# Patient Record
Sex: MALE | Race: WHITE
[De-identification: names, ages, dates, MRNs, and addresses within clinical notes are randomized per-mention and may not be internally consistent; named-entity substitution may affect disease eponyms.]

---

## 2017-06-17 ENCOUNTER — HOSPITAL ENCOUNTER (INPATIENT)
Dept: HOSPITAL 62 - ER | Age: 72
LOS: 2 days | Discharge: HOME HEALTH | DRG: 872 | End: 2017-06-19
Attending: INTERNAL MEDICINE | Admitting: INTERNAL MEDICINE
Payer: MEDICARE

## 2017-06-17 DIAGNOSIS — Z96.642: ICD-10-CM

## 2017-06-17 DIAGNOSIS — I25.10: ICD-10-CM

## 2017-06-17 DIAGNOSIS — F17.200: ICD-10-CM

## 2017-06-17 DIAGNOSIS — I50.9: ICD-10-CM

## 2017-06-17 DIAGNOSIS — N17.9: ICD-10-CM

## 2017-06-17 DIAGNOSIS — I10: ICD-10-CM

## 2017-06-17 DIAGNOSIS — I71.4: ICD-10-CM

## 2017-06-17 DIAGNOSIS — Z79.899: ICD-10-CM

## 2017-06-17 DIAGNOSIS — Z79.82: ICD-10-CM

## 2017-06-17 DIAGNOSIS — Z79.02: ICD-10-CM

## 2017-06-17 DIAGNOSIS — J01.90: ICD-10-CM

## 2017-06-17 DIAGNOSIS — E11.9: ICD-10-CM

## 2017-06-17 DIAGNOSIS — I25.2: ICD-10-CM

## 2017-06-17 DIAGNOSIS — J44.1: ICD-10-CM

## 2017-06-17 DIAGNOSIS — A41.9: Primary | ICD-10-CM

## 2017-06-17 DIAGNOSIS — I73.9: ICD-10-CM

## 2017-06-17 DIAGNOSIS — F03.90: ICD-10-CM

## 2017-06-17 LAB
%HYPO/RBC NFR BLD AUTO: SLIGHT %
ALBUMIN SERPL-MCNC: 3.9 G/DL (ref 3.5–5)
ALP SERPL-CCNC: 97 U/L (ref 38–126)
ALT SERPL-CCNC: 24 U/L (ref 21–72)
ANION GAP SERPL CALC-SCNC: 10 MMOL/L (ref 5–19)
APPEARANCE UR: CLEAR
AST SERPL-CCNC: 35 U/L (ref 17–59)
BASE EXCESS BLDV CALC-SCNC: 3.6 MMOL/L
BASOPHILS NFR BLD MANUAL: 0 % (ref 0–2)
BILIRUB DIRECT SERPL-MCNC: 0.4 MG/DL (ref 0–0.4)
BILIRUB SERPL-MCNC: 0.7 MG/DL (ref 0.2–1.3)
BILIRUB UR QL STRIP: NEGATIVE
BUN SERPL-MCNC: 20 MG/DL (ref 7–20)
CALCIUM: 9.3 MG/DL (ref 8.4–10.2)
CHLORIDE SERPL-SCNC: 95 MMOL/L (ref 98–107)
CO2 SERPL-SCNC: 30 MMOL/L (ref 22–30)
CREAT SERPL-MCNC: 1.59 MG/DL (ref 0.52–1.25)
EOSINOPHIL NFR BLD MANUAL: 1 % (ref 0–6)
ERYTHROCYTE [DISTWIDTH] IN BLOOD BY AUTOMATED COUNT: 14.7 % (ref 11.5–14)
GLUCOSE SERPL-MCNC: 116 MG/DL (ref 75–110)
GLUCOSE UR STRIP-MCNC: NEGATIVE MG/DL
HCO3 BLDV-SCNC: 31.1 MMOL/L (ref 20–32)
HCT VFR BLD CALC: 47 % (ref 37.9–51)
HGB BLD-MCNC: 15.3 G/DL (ref 13.5–17)
HGB HCT DIFFERENCE: -1.1
KETONES UR STRIP-MCNC: NEGATIVE MG/DL
MCH RBC QN AUTO: 29 PG (ref 27–33.4)
MCHC RBC AUTO-ENTMCNC: 32.6 G/DL (ref 32–36)
MCV RBC AUTO: 89 FL (ref 80–97)
NEUTS BAND NFR BLD MANUAL: 6 % (ref 3–5)
NITRITE UR QL STRIP: NEGATIVE
PCO2 BLDV: 59.4 MMHG (ref 35–63)
PH BLDV: 7.34 [PH] (ref 7.3–7.42)
PH UR STRIP: 7 [PH] (ref 5–9)
PLATELET CLUMP BLD QL SMEAR: PRESENT
POTASSIUM SERPL-SCNC: 4.8 MMOL/L (ref 3.6–5)
PROT SERPL-MCNC: 7.2 G/DL (ref 6.3–8.2)
PROT UR STRIP-MCNC: NEGATIVE MG/DL
PROTHROMBIN TIME: 12.7 SEC (ref 11.4–15.4)
RBC # BLD AUTO: 5.29 10^6/UL (ref 4.35–5.55)
SODIUM SERPL-SCNC: 135.3 MMOL/L (ref 137–145)
SP GR UR STRIP: 1.01
TOTAL CELLS COUNTED BLD: 100
UROBILINOGEN UR-MCNC: NEGATIVE MG/DL (ref ?–2)
VARIANT LYMPHS NFR BLD MANUAL: 3 % (ref 13–45)
WBC # BLD AUTO: 15.7 10^3/UL (ref 4–10.5)

## 2017-06-17 PROCEDURE — 83880 ASSAY OF NATRIURETIC PEPTIDE: CPT

## 2017-06-17 PROCEDURE — 71010: CPT

## 2017-06-17 PROCEDURE — 93005 ELECTROCARDIOGRAM TRACING: CPT

## 2017-06-17 PROCEDURE — 96365 THER/PROPH/DIAG IV INF INIT: CPT

## 2017-06-17 PROCEDURE — 85025 COMPLETE CBC W/AUTO DIFF WBC: CPT

## 2017-06-17 PROCEDURE — 82803 BLOOD GASES ANY COMBINATION: CPT

## 2017-06-17 PROCEDURE — 85610 PROTHROMBIN TIME: CPT

## 2017-06-17 PROCEDURE — 36415 COLL VENOUS BLD VENIPUNCTURE: CPT

## 2017-06-17 PROCEDURE — 87086 URINE CULTURE/COLONY COUNT: CPT

## 2017-06-17 PROCEDURE — 71275 CT ANGIOGRAPHY CHEST: CPT

## 2017-06-17 PROCEDURE — 83605 ASSAY OF LACTIC ACID: CPT

## 2017-06-17 PROCEDURE — 93010 ELECTROCARDIOGRAM REPORT: CPT

## 2017-06-17 PROCEDURE — 80053 COMPREHEN METABOLIC PANEL: CPT

## 2017-06-17 PROCEDURE — 81001 URINALYSIS AUTO W/SCOPE: CPT

## 2017-06-17 PROCEDURE — 80048 BASIC METABOLIC PNL TOTAL CA: CPT

## 2017-06-17 PROCEDURE — 87040 BLOOD CULTURE FOR BACTERIA: CPT

## 2017-06-17 PROCEDURE — 94640 AIRWAY INHALATION TREATMENT: CPT

## 2017-06-17 PROCEDURE — 84484 ASSAY OF TROPONIN QUANT: CPT

## 2017-06-17 PROCEDURE — 82962 GLUCOSE BLOOD TEST: CPT

## 2017-06-17 PROCEDURE — 70450 CT HEAD/BRAIN W/O DYE: CPT

## 2017-06-17 PROCEDURE — 74177 CT ABD & PELVIS W/CONTRAST: CPT

## 2017-06-17 PROCEDURE — 99285 EMERGENCY DEPT VISIT HI MDM: CPT

## 2017-06-17 RX ADMIN — SODIUM CHLORIDE PRN ML: 9 INJECTION, SOLUTION INTRAVENOUS at 21:25

## 2017-06-17 RX ADMIN — CLINDAMYCIN PHOSPHATE SCH ML: 12 INJECTION, SOLUTION INTRAVENOUS at 21:24

## 2017-06-17 RX ADMIN — GABAPENTIN SCH MG: 300 CAPSULE ORAL at 21:25

## 2017-06-17 NOTE — RADIOLOGY REPORT (SQ)
EXAM DESCRIPTION:  CTA CHEST



COMPLETED DATE/TIME:  6/17/2017 4:36 pm



REASON FOR STUDY:  SOB, tachycardia, hypoxia



COMPARISON:  None.



TECHNIQUE:  CT scan of the chest performed using helical scanning technique with dynamic intravenous 
contrast injection.  Images reviewed with lung, soft tissue and bone windows.  Reconstructed coronal 
and sagittal MPR images reviewed.

Additional 3 dimensional post-processing performed to develop Maximal Intensity Projection images (MI
P).  All images stored on PACS.

All CT scanners at this facility use dose modulation, iterative reconstruction, and/or weight based d
osing when appropriate to reduce radiation dose to as low as reasonably achievable (ALARA).

[facility]: Dose Right  [facility]: CareDose    [facility]: Dose Right    [facility]: Teradose 4D    [facility]: Smart Technologies



CONTRAST TYPE AND DOSE:  82 mL of Isovue 300- low osmolar.



RENAL FUNCTION:  BUN 20 creatinine 1.59



RADIATION DOSE:  41.33 .



LIMITATIONS:  None.



FINDINGS:  LUNGS AND PLEURA: There is parenchymal scarring and cystic changes noted in both lower lob
es as well as scattered in the right upper lobe.  Represent underlying pulmonary fibrosis.  Scattered
 cystic changes are noted throughout the lungs could represent  emphysematous disease.  There is a 1.
6 cm nodular opacity noted in the right upper lobe (series 4, image 49).  No focal infiltrates.  No p
neumothorax.  No significant pleural effusion.

AORTA AND GREAT VESSELS: No aneurysm or dissection.  There is diffuse fibrofatty and calcific atherom
atous disease seen throughout the descending thoracic aorta.

HEART: No pericardial effusion.  Coronary artery calcifications are noted.  There is calcifications o
f the aortic valve annulus.

PULMONARY ARTERIES: No emboli visualized in the main pulmonary arteries or the segmental branches.

HILAR AND MEDIASTINAL STRUCTURES: Soft tissue thickening seen in the right hilum could be related to 
adenopathy.  There is minimal thickening seen in the left hilum.  No significant mediastinal adenopat
hy identified.

HARDWARE: None in the chest.

UPPER ABDOMEN: See separate report of the CT of the abdomen.

THYROID AND OTHER SOFT TISSUES: No masses.  No adenopathy.

BONES: No acute or significant finding.  Multilevel degenerative changes noted in the spine.

3D MIPS: Confirm above findings.

OTHER: No other significant finding.



IMPRESSION:  1.  No acute pulmonary embolus identified.

2.  There is some parenchymal scarring and cystic changes noted the lower lobe this could be represen
t an of the underlying pulmonary fibrosis versus new parenchymal scarring related to chronic obstruct
celine pulmonary disease.  There is some parenchymal scarring noted throughout the right upper lobe.  So
me cystic changes seen in both upper lobes suggestive of emphysema.  There is a 1.6 cm nodular like o
pacity seen in the right upper lobe, follow-up recommendations of the below.  No focal consolidations
.



COMMENT:   FLEISCHNER CRITERIA FOR FOLLOW-UP OF PULMONARY NODULES

Incidentally detected new nodules in persons 35 or older.

HIGH RISK: History of smoking or other known risk factors.

>8mm single solid nodule: LOW and HIGH RISK: consider CT, PET/CT or biopsy at 3 mo.



TECHNICAL DOCUMENTATION:  JOB ID:  [id number]

Quality ID # 436: Final reports with documentation of one or more dose reduction techniques (e.g., Au
tomated exposure control, adjustment of the mA and/or kV according to patient size, use of iterative 
reconstruction technique)

 2011 [employer]- All Rights Reserved

## 2017-06-17 NOTE — RADIOLOGY REPORT (SQ)
EXAM DESCRIPTION:  CT HEAD WITHOUT



COMPLETED DATE/TIME:  6/17/2017 3:11 pm



REASON FOR STUDY:  fall, AMS



COMPARISON:  Carotid Doppler 10/29/2015



TECHNIQUE:  Axial images acquired through the brain without intravenous contrast.  Images reviewed wi
th bone, brain and subdural windows.  Images stored on PACS.

All CT scanners at this facility use dose modulation, iterative reconstruction, and/or weight based d
osing when appropriate to reduce radiation dose to as low as reasonably achievable (ALARA).

[facility]: Dose Right  [facility]: CareDose    [facility]: Dose Right    [facility]: Teradose 4D    [facility]: [employer]



RADIATION DOSE:  64.61 mGy.



LIMITATIONS:  None.



FINDINGS:  VENTRICLES: Normal size and contour.

CEREBRUM: No CT evidence of acute large territory ischemic change, acute intracranial hemorrhage, mas
s effect, or midline shift.

There is moderate bifrontal and biparietal small vessel ischemic change with a chronic lacunar infarc
t in the left caudate.

CEREBELLUM: No masses.  No hemorrhage.  No alteration of density.  No evidence for acute infarction.

EXTRAAXIAL SPACES: No fluid collections.  No masses.

ORBITS AND GLOBE: No intra- or extraconal masses.  Normal contour of globe without masses.

CALVARIUM: No fracture.

PARANASAL SINUSES: No fluid or mucosal thickening.

SOFT TISSUES: No mass or hematoma.

OTHER: No other significant finding.



IMPRESSION:  No acute findings.  Small vessel white matter disease with old lacunar infarct in the le
ft caudate.



TECHNICAL DOCUMENTATION:  JOB ID:  [id number]

Quality ID # 436: Final reports with documentation of one or more dose reduction techniques (e.g., Au
tomated exposure control, adjustment of the mA and/or kV according to patient size, use of iterative 
reconstruction technique)

 2011 [employer]- All Rights Reserved

## 2017-06-17 NOTE — RADIOLOGY REPORT (SQ)
EXAM DESCRIPTION:  CT ABD/PELVIS WITH IV ONLY



COMPLETED DATE/TIME:  6/17/2017 4:37 pm



REASON FOR STUDY:  sepsis, source unknown, abd pain



COMPARISON:  None.



TECHNIQUE:  CT scan of the abdomen and pelvis performed using helical scanning technique with dynamic
 intravenous contrast injection.  No oral contrast. Images reviewed with lung, soft tissue, and bone 
windows. Reconstructed coronal and sagittal MPR images reviewed. Delayed images for evaluation of the
 urinary system also acquired. All images stored on PACS.

All CT scanners at this facility use dose modulation, iterative reconstruction, and/or weight based d
osing when appropriate to reduce radiation dose to as low as reasonably achievable (ALARA).

[facility]: Dose Right  [facility]: CareDose    [facility]: Dose Right    [facility]: Teradose 4D    [facility]: [employer]



CONTRAST TYPE AND DOSE:  contrast/concentration: Isovue 370.00 mg/ml; Total Contrast Delivered: 82.0 
ml; Total Saline Delivered: 78.1 ml



RENAL FUNCTION:  BUN 20 creatinine 1.59



RADIATION DOSE:  32.35.



LIMITATIONS:  None.



FINDINGS:  LOWER CHEST: See separate report of the CT of the chest.

LIVER: Normal size. No masses or dilated ducts.

SPLEEN: Normal size. No focal lesions.

PANCREAS: No masses. No significant calcifications. No adjacent inflammation or peripancreatic fluid 
collections. Pancreatic duct not dilated.  Diffuse fatty replacement of the pancreas.

GALLBLADDER: No identified stones by CT criteria. No inflammatory changes to suggest cholecystitis.

ADRENAL GLANDS: 1.7 cm nodule noted in the left adrenal gland.  Most likely represents an adenoma but
 cannot be properly evaluated on this study.  Right adrenal is normal.

RIGHT KIDNEY AND URETER: No solid masses.  Numerous exophytic cysts noted throughout.  No significant
 calcifications.   No hydronephrosis or hydroureter.

LEFT KIDNEY AND URETER: No solid masses.   No significant calcifications.   No hydronephrosis or hydr
oureter.

AORTA AND VESSELS: There is aneurysmal dilatation of the infrarenal abdominal aorta measuring 3.4 x 3
.3 cm in greatest AP and transverse dimensions.  There is mural thrombus and/or fibrofatty plaque wit
hin the aneurysm sac.  Scattered calcific atherosclerosis noted in the vessel walls.  No dissection. 
 The celiac and SMA and renal arteries are patent.  DANIELA appears occluded at its origin and fills via 
collateral filling.

RETROPERITONEUM: No retroperitoneal adenopathy, hemorrhage or masses.

BOWEL AND PERITONEAL CAVITY: No masses or inflammatory changes. No free fluid or peritoneal masses.  
No pneumoperitoneum.

APPENDIX: Not visualized.  No blind-ending dilated loops of bowel are noted in the right lower quadra
nt.  No inflammatory changes seen in the right lower quadrant.

PELVIS: No masses, adenopathy, or free fluid.  There is slight thickening of the urinary bladder wall
 which may be secondary to underdistention versus muscle hypertrophy.

ABDOMINAL WALL: No masses. No hernias.

BONES: No suspicious or acute osseous abnormality identified.  Multilevel degenerative changes of the
 spine.  Left total hip arthroplasty.

OTHER: No other significant finding.



IMPRESSION:  1.  No acute abnormality identified within the abdomen pelvis

2.  Aneurysmal dilatation of the infrarenal abdominal aorta measuring 3.4 x 3.3 cm in greatest AP and
 transverse dimension.  Mural thrombus noted within the aneurysm sac.  No dissection.  SMA and celiac
 and renal arteries are patent.  The DANIELA origin appears occluded and fills via collateral filling.

3.  1.7 cm nodule in the left adrenal gland.  Not properly characterized on this study.



TECHNICAL DOCUMENTATION:  JOB ID:  [id number]

Quality ID # 436: Final reports with documentation of one or more dose reduction techniques (e.g., Au
tomated exposure control, adjustment of the mA and/or kV according to patient size, use of iterative 
reconstruction technique)

 2011 [email or web address]- All Rights Reserved

## 2017-06-17 NOTE — RADIOLOGY REPORT (SQ)
EXAM DESCRIPTION:  CHEST SINGLE VIEW



COMPLETED DATE/TIME:  6/17/2017 2:43 pm



REASON FOR STUDY:  SOB, fever



COMPARISON:  AP chest 2/17/2009



EXAM PARAMETERS:  NUMBER OF VIEWS: One view.

TECHNIQUE: Single frontal radiographic view of the chest acquired.

RADIATION DOSE: NA

LIMITATIONS: None.



FINDINGS:  LUNGS AND PLEURA: Mild increased interstitial markings at both bases may represent Kerley 
lines from mild interstitial edema.

No fluffy alveolar infiltrates worrisome for alveolar edema or pneumonia.  No gross pleural effusion.
  No pneumothorax.

MEDIASTINUM AND HILAR STRUCTURES: No masses.  Contour normal.

HEART AND VASCULAR STRUCTURES: Heart normal in size.  Normal vasculature.

BONES: No acute findings.

HARDWARE: None in the chest.

OTHER: No other significant finding.



IMPRESSION:  Few Kerley lines at the lung bases could indicate mild interstitial edema.  No pleural e
ffusions or alveolar edema.  No cardiomegaly



TECHNICAL DOCUMENTATION:  JOB ID:  [id number]

## 2017-06-17 NOTE — PDOC H&P
History of Present Illness


Admission Date/PCP: 


  





  CRYSTAL GOOD MD





Patient complains of: Fall


History of Present Illness: 


MIGEL LE is a 71 year old male, w/ history of dementia, COPD, CHF has 

runny nose and sinus congestion for 3 weeks. There is mild headache.  He is not 

on the home oxygen.  He has a mild cough productive of yellowish phlegm.  There 

is no sore throat associated.  No hemoptysis.  There is no pleurisy, shortness 

of breath, dizziness or lightheadedness, but admits having some facial pressure 

intermittently.  Denies any chills or fever as well.  Earlier the day prior to 

admission patient fell on the floor but without any significant injuries.  He 

was having generalized weakness and unable to get up.  He was found by his wife 

and was brought to the hospital.  In the emergency room where workup was 

performed he has lactic acid level was mildly elevated as well as brain 

natruretic peptide.  Chest x-ray shows chronic interstitial findings.  He does 

have a history of CHF.  Emergency physician thought it was a combination of 

sepsis as well as heart failure and so antibiotic was given but no aggressive 

IV fluid management was done.  Patient did have a fever in the emergency room.  

He denies recent travel nor any insect bites.





Past Medical History


Cardiac Medical History: Reports: Congestive Heart Failure, Coronary Artery 

Disease, Myocardial Infarction - x2, last one approx 4 yrs ago, Hypertension - 

medicated, Peripheral Vascular Disease, Other - Abdominal aorta aneurysm


Pulmonary Medical History: Reports: Chronic Obstructive Pulmonary Disease (COPD)


   Denies: Asthma


Neurological Medical History: Reports: Other - Dementia


   Denies: Seizures


Endocrine Medical History: Reports: Diabetes Mellitus Type 2


GI Medical History: 


   Denies: Hepatitis, Hiatal Hernia


Musculoskeltal Medical History: Reports: Other - left hip replacement


Hematology: 


   Denies: Anemia, Sickle Cell Disease





Past Surgical History


Past Surgical History: Reports: Orthopedic Surgery - left hip replacement, 

Other - Cataract surgery, femoral popliteal bypass


   Denies: Pacemaker





Social History


Information Source: Patient


Smoking Status: Current Every Day Smoker


Frequency of Alcohol Use: None


Hx Recreational Drug Use: No


Drugs: None





Family History


Family History: None


Parental Family History Reviewed: Yes


Children Family History Reviewed: Yes


Sibling(s) Family History Reviewed.: Yes





Medication/Allergy


Home Medications: 








Aspirin 81 mg PO DAILY 12/29/15 


Atorvastatin Calcium [Lipitor 20 mg Tablet] 20 mg PO QHS 12/29/15 


Clopidogrel Bisulfate [Plavix] 75 mg PO DAILY 12/29/15 


Diazepam [Valium] 20 mg PO QHS 12/29/15 


Gabapentin [Neurontin] 600 mg PO QHS 12/29/15 


Lisinopril 40 mg PO DAILY 12/29/15 


Metformin HCl [Glucophage] 1,000 mg PO QPM 12/29/15 


Metoprolol Succinate 200 mg PO DAILY 12/29/15 


Nitroglycerin [Nitrostat 0.4 mg (1/150 Gr) Tabs 25/Bottle] 1 tab SL Q5MP PRN 12/

29/15 


Omeprazole 20 mg PO DAILY 12/29/15 


Oxycodone HCl 30 mg PO QAM 12/29/15 


Donepezil HCl 5 mg PO DAILY 06/17/17 


Meloxicam 7.5 mg PO DAILY 06/17/17 


Oxycodone HCl 15 mg PO Q4HP PRN 06/17/17 








Allergies/Adverse Reactions: 


 





Penicillins Allergy (Verified 12/29/15 12:58)


 unknown reaction from a child











Review of Systems


Constitutional: PRESENT: fatigue, headache(s), weakness - Generalized.  ABSENT: 

chills, fever(s), weight gain, weight loss


Eyes: ABSENT: visual disturbances


Ears: ABSENT: hearing changes


Nose, Mouth, and Throat: ABSENT: mouth pain, sore throat


Cardiovascular: ABSENT: chest pain, dyspnea on exertion, edema, orthropnea, 

palpitations


Respiratory: PRESENT: cough, sputum.  ABSENT: dyspnea, hemoptysis


Gastrointestinal: ABSENT: abdominal pain, constipation, diarrhea, hematemesis, 

hematochezia, melena, nausea, vomiting


Genitourinary: ABSENT: dysuria, hematuria


Musculoskeletal: ABSENT: joint swelling


Integumentary: ABSENT: pruritus, rash, wounds


Neurological: ABSENT: abnormal gait, abnormal speech, confusion, dizziness, 

focal weakness, syncope


Psychiatric: ABSENT: anxiety, depression, homidical ideation, suicidal ideation


Endocrine: ABSENT: cold intolerance, heat intolerance, polydipsia, polyuria


Hematologic/Lymphatic: ABSENT: easy bleeding, easy bruising





Physical Exam


Vital Signs: 


 











Temp Pulse Resp BP Pulse Ox


 


 100.1 F   115 H  20   117/84   93 


 


 06/17/17 12:57  06/17/17 12:57  06/17/17 16:00  06/17/17 12:57  06/17/17 16:00











General appearance: PRESENT: no acute distress, cooperative, obese


Head exam: PRESENT: atraumatic, normocephalic


Eye exam: PRESENT: conjunctiva pink, EOMI, PERRLA.  ABSENT: scleral icterus


Ear exam: PRESENT: normal external ear exam.  ABSENT: drainage


Mouth exam: PRESENT: moist, neck supple, tongue midline


Throat exam: PRESENT: other - Postnasal drip.  ABSENT: post pharyngeal erythema

, tonsillar erythema


Neck exam: ABSENT: carotid bruit, JVD, lymphadenopathy, thyromegaly


Respiratory exam: PRESENT: clear to auscultation elle - Anteriorly, decreased 

breath sounds - Bilateral.  ABSENT: rales, rhonchi, wheezes


Cardiovascular exam: PRESENT: RRR.  ABSENT: diastolic murmur, rubs, systolic 

murmur


Pulses: PRESENT: normal dorsalis pedis pul


Vascular exam: PRESENT: normal capillary refill


GI/Abdominal exam: PRESENT: normal bowel sounds, soft.  ABSENT: distended, 

guarding, mass, organolmegaly, rebound, tenderness


Rectal exam: PRESENT: deferred


Extremities exam: PRESENT: full ROM.  ABSENT: calf tenderness, clubbing, pedal 

edema


Neurological exam: PRESENT: alert, awake, oriented to person, oriented to place

, oriented to time, oriented to situation, CN II-XII grossly intact


Psychiatric exam: PRESENT: appropriate affect, normal mood.  ABSENT: homicidal 

ideation, suicidal ideation


Skin exam: PRESENT: dry, intact, warm.  ABSENT: cyanosis, rash





Results


Laboratory Results: 


 





 06/17/17 14:03 





 06/17/17 14:03 





 











  06/17/17 06/17/17 06/17/17





  13:34 14:03 14:03


 


WBC   15.7 H 


 


RBC   5.29 


 


Hgb   15.3 


 


Hct   47.0 


 


MCV   89 


 


MCH   29.0 


 


MCHC   32.6 


 


RDW   14.7 H 


 


Plt Count   154 


 


Seg Neutrophils %   Not Reportable 


 


Lymphocytes %   Not Reportable 


 


Monocytes %   Not Reportable 


 


Eosinophils %   Not Reportable 


 


Basophils %   Not Reportable 


 


Absolute Neutrophils   Not Reportable 


 


Absolute Lymphocytes   Not Reportable 


 


Absolute Monocytes   Not Reportable 


 


Absolute Eosinophils   Not Reportable 


 


Absolute Basophils   Not Reportable 


 


VBG pH   


 


VBG pCO2   


 


VBG HCO3   


 


VBG Base Excess   


 


Sodium    135.3 L


 


Potassium    4.8


 


Chloride    95 L


 


Carbon Dioxide    30


 


Anion Gap    10


 


BUN    20


 


Creatinine    1.59 H


 


Est GFR ( Amer)    52 L


 


Est GFR (Non-Af Amer)    43 L


 


Glucose    116 H


 


Lactic Acid   


 


Calcium    9.3


 


Total Bilirubin    0.7


 


AST    35


 


ALT    24


 


Alkaline Phosphatase    97


 


Total Protein    7.2


 


Albumin    3.9


 


Urine Color  YELLOW  


 


Urine Appearance  CLEAR  


 


Urine pH  7.0  


 


Ur Specific Gravity  1.008  


 


Urine Protein  NEGATIVE  


 


Urine Glucose (UA)  NEGATIVE  


 


Urine Ketones  NEGATIVE  


 


Urine Blood  NEGATIVE  


 


Urine Nitrite  NEGATIVE  


 


Ur Leukocyte Esterase  NEGATIVE  


 


Urine WBC (Auto)  0  


 


Urine RBC (Auto)  1  














  06/17/17 06/17/17





  14:03 15:50


 


WBC  


 


RBC  


 


Hgb  


 


Hct  


 


MCV  


 


MCH  


 


MCHC  


 


RDW  


 


Plt Count  


 


Seg Neutrophils %  


 


Lymphocytes %  


 


Monocytes %  


 


Eosinophils %  


 


Basophils %  


 


Absolute Neutrophils  


 


Absolute Lymphocytes  


 


Absolute Monocytes  


 


Absolute Eosinophils  


 


Absolute Basophils  


 


VBG pH   7.34


 


VBG pCO2   59.4


 


VBG HCO3   31.1


 


VBG Base Excess   3.6


 


Sodium  


 


Potassium  


 


Chloride  


 


Carbon Dioxide  


 


Anion Gap  


 


BUN  


 


Creatinine  


 


Est GFR ( Amer)  


 


Est GFR (Non-Af Amer)  


 


Glucose  


 


Lactic Acid  2.4 H 


 


Calcium  


 


Total Bilirubin  


 


AST  


 


ALT  


 


Alkaline Phosphatase  


 


Total Protein  


 


Albumin  


 


Urine Color  


 


Urine Appearance  


 


Urine pH  


 


Ur Specific Gravity  


 


Urine Protein  


 


Urine Glucose (UA)  


 


Urine Ketones  


 


Urine Blood  


 


Urine Nitrite  


 


Ur Leukocyte Esterase  


 


Urine WBC (Auto)  


 


Urine RBC (Auto)  








 











  06/17/17 06/17/17





  14:03 14:03


 


Troponin I  0.015 


 


NT-Pro-B Natriuret Pep   1760 H











Impressions: 


 





Chest X-Ray  06/17/17 13:53


IMPRESSION:  Few Kerley lines at the lung bases could indicate mild 

interstitial edema.  No pleural effusions or alveolar edema.  No cardiomegaly


 








Head CT  06/17/17 13:53


IMPRESSION:  No acute findings.  Small vessel white matter disease with old 

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


 








Chest/Abdomen CTA  06/17/17 15:45


IMPRESSION:  1.  No acute pulmonary embolus identified.


2.  There is some parenchymal scarring and cystic changes noted the lower lobe 

this could be represent an of the underlying pulmonary fibrosis versus new 

parenchymal scarring related to chronic obstructive pulmonary disease.  There 

is some parenchymal scarring noted throughout the right upper lobe.  Some 

cystic changes seen in both upper lobes suggestive of emphysema.  There is a 

1.6 cm nodular like opacity seen in the right upper lobe, follow-up 

recommendations of the below.  No focal consolidations.


 








Abdomen/Pelvis CT  06/17/17 15:46


IMPRESSION:  1.  No acute abnormality identified within the abdomen pelvis


2.  Aneurysmal dilatation of the infrarenal abdominal aorta measuring 3.4 x 3.3 

cm in greatest AP and transverse dimension.  Mural thrombus noted within the 

aneurysm sac.  No dissection.  SMA and celiac and renal arteries are patent.  

The DANIELA origin appears occluded and fills via collateral filling.


3.  1.7 cm nodule in the left adrenal gland.  Not properly characterized on 

this study.


 














Assessment & Plan





- Diagnosis


(1) Sepsis


Qualifiers: 


     Sepsis type: sepsis due to unspecified organism        Qualified Code(s): 

A41.9 - Sepsis, unspecified organism  


Is this a current diagnosis for this admission?: Yes





(2) Acute sinusitis


Qualifiers: 


     Sinusitis location: unspecified location     Recurrence: not specified as 

recurrent        Qualified Code(s): J01.90 - Acute sinusitis, unspecified  


Is this a current diagnosis for this admission?: Yes





(3) Acute renal failure


Qualifiers: 


     Acute renal failure type: unspecified        Qualified Code(s): N17.9 - 

Acute kidney failure, unspecified  


Is this a current diagnosis for this admission?: Yes





(4) COPD (chronic obstructive pulmonary disease)


Qualifiers: 


     COPD type: unspecified COPD        Qualified Code(s): J44.9 - Chronic 

obstructive pulmonary disease, unspecified  


Is this a current diagnosis for this admission?: Yes





(5) Senile dementia


Qualifiers: 


     Dementia behavioral disturbance: without behavioral disturbance        

Qualified Code(s): F03.90 - Unspecified dementia without behavioral disturbance

  


Is this a current diagnosis for this admission?: Yes





(6) Diabetes mellitus type 2 in nonobese


Is this a current diagnosis for this admission?: Yes





(7) Abdominal aortic aneurysm


Qualifiers: 


     Presence of rupture: without rupture        Qualified Code(s): I71.4 - 

Abdominal aortic aneurysm, without rupture  


Is this a current diagnosis for this admission?: Yes





(8) Coronary artery disease


Qualifiers: 


     Coronary Disease-Associated Artery/Lesion type: native artery     Native 

vs. transplanted heart: native heart     Associated angina: without angina     

   Qualified Code(s): I25.10 - Atherosclerotic heart disease of native coronary 

artery without angina pectoris  


Is this a current diagnosis for this admission?: Yes





(9) Essential hypertension


Is this a current diagnosis for this admission?: Yes





(10) Peripheral vascular disease


Is this a current diagnosis for this admission?: Yes








- Time


Time Spent: 50 to 70 Minutes





- Inpatient Certification


Based on my medical assessment, after consideration of the patient's 

comorbidities, presenting symptoms, or acuity I expect that the services needed 

warrant INPATIENT care.: Yes


I certify that my determination is in accordance with my understanding of 

Medicare's requirements for reasonable and necessary INPATIENT services [42 CFR 

412.3e].: Yes


Medical Necessity: Need Close Monitoring Due to Risk of Patient Decompensation, 

Need For IV Fluids, Need For Continuous Telemetry Monitoring, Need for IV 

Antibiotics


Post Hospital Care: D/C Planner Documentation





- Plan Summary


Plan Summary: 


The patient will be admitted to telemetry.  I will gently hydrate the patient 

with normal saline.  We will begin broad-spectrum antibiotic with Levaquin and 

clindamycin.  In the meantime we will monitor creatinine and WBC.  DVT 

prophylaxis with Lovenox will be placed.  Antipyretics will be given as needed.

  We will check a KUB for impaction.  Patient's mildly elevated lactic acid 

could be secondary to metformin, we will hold the medication for now.  Further 

testing depends on the initial evaluation as outlined above.

## 2017-06-18 LAB
ANION GAP SERPL CALC-SCNC: 7 MMOL/L (ref 5–19)
BASOPHILS # BLD AUTO: 0.1 10^3/UL (ref 0–0.2)
BASOPHILS NFR BLD AUTO: 0.3 % (ref 0–2)
BUN SERPL-MCNC: 24 MG/DL (ref 7–20)
CALCIUM: 8 MG/DL (ref 8.4–10.2)
CHLORIDE SERPL-SCNC: 101 MMOL/L (ref 98–107)
CO2 SERPL-SCNC: 26 MMOL/L (ref 22–30)
CREAT SERPL-MCNC: 1.69 MG/DL (ref 0.52–1.25)
EOSINOPHIL # BLD AUTO: 0 10^3/UL (ref 0–0.6)
EOSINOPHIL NFR BLD AUTO: 0.1 % (ref 0–6)
ERYTHROCYTE [DISTWIDTH] IN BLOOD BY AUTOMATED COUNT: 15.3 % (ref 11.5–14)
GLUCOSE SERPL-MCNC: 92 MG/DL (ref 75–110)
HCT VFR BLD CALC: 35.9 % (ref 37.9–51)
HGB BLD-MCNC: 11.7 G/DL (ref 13.5–17)
HGB HCT DIFFERENCE: -0.8
LYMPHOCYTES # BLD AUTO: 1.7 10^3/UL (ref 0.5–4.7)
LYMPHOCYTES NFR BLD AUTO: 8.9 % (ref 13–45)
MCH RBC QN AUTO: 29.4 PG (ref 27–33.4)
MCHC RBC AUTO-ENTMCNC: 32.6 G/DL (ref 32–36)
MCV RBC AUTO: 90 FL (ref 80–97)
MONOCYTES # BLD AUTO: 1.1 10^3/UL (ref 0.1–1.4)
MONOCYTES NFR BLD AUTO: 5.8 % (ref 3–13)
NEUTROPHILS # BLD AUTO: 16.5 10^3/UL (ref 1.7–8.2)
NEUTS SEG NFR BLD AUTO: 84.9 % (ref 42–78)
POTASSIUM SERPL-SCNC: 5.5 MMOL/L (ref 3.6–5)
RBC # BLD AUTO: 3.97 10^6/UL (ref 4.35–5.55)
SODIUM SERPL-SCNC: 133.9 MMOL/L (ref 137–145)
WBC # BLD AUTO: 19.4 10^3/UL (ref 4–10.5)

## 2017-06-18 RX ADMIN — SODIUM CHLORIDE PRN ML: 9 INJECTION, SOLUTION INTRAVENOUS at 10:56

## 2017-06-18 RX ADMIN — ENOXAPARIN SODIUM SCH MG: 40 INJECTION SUBCUTANEOUS at 10:55

## 2017-06-18 RX ADMIN — CLOPIDOGREL BISULFATE SCH MG: 75 TABLET, FILM COATED ORAL at 10:55

## 2017-06-18 RX ADMIN — CLINDAMYCIN PHOSPHATE SCH ML: 12 INJECTION, SOLUTION INTRAVENOUS at 05:30

## 2017-06-18 RX ADMIN — OXYCODONE HYDROCHLORIDE PRN MG: 5 TABLET ORAL at 17:08

## 2017-06-18 RX ADMIN — GABAPENTIN SCH MG: 300 CAPSULE ORAL at 22:39

## 2017-06-18 RX ADMIN — CLINDAMYCIN PHOSPHATE SCH ML: 12 INJECTION, SOLUTION INTRAVENOUS at 22:39

## 2017-06-18 RX ADMIN — DOCUSATE SODIUM SCH MG: 100 CAPSULE, LIQUID FILLED ORAL at 10:56

## 2017-06-18 RX ADMIN — LANSOPRAZOLE SCH MG: 30 TABLET, ORALLY DISINTEGRATING, DELAYED RELEASE ORAL at 05:30

## 2017-06-18 RX ADMIN — CLINDAMYCIN PHOSPHATE SCH ML: 12 INJECTION, SOLUTION INTRAVENOUS at 14:55

## 2017-06-18 RX ADMIN — FLUTICASONE PROPIONATE AND SALMETEROL SCH INH: 50; 250 POWDER RESPIRATORY (INHALATION) at 17:08

## 2017-06-18 RX ADMIN — DOCUSATE SODIUM SCH MG: 100 CAPSULE, LIQUID FILLED ORAL at 17:09

## 2017-06-18 RX ADMIN — IPRATROPIUM BROMIDE AND ALBUTEROL SULFATE SCH ML: 2.5; .5 SOLUTION RESPIRATORY (INHALATION) at 20:00

## 2017-06-18 RX ADMIN — IPRATROPIUM BROMIDE AND ALBUTEROL SULFATE SCH ML: 2.5; .5 SOLUTION RESPIRATORY (INHALATION) at 14:19

## 2017-06-18 RX ADMIN — METOPROLOL SUCCINATE SCH MG: 50 TABLET, EXTENDED RELEASE ORAL at 10:56

## 2017-06-18 RX ADMIN — ASPIRIN SCH MG: 81 TABLET, CHEWABLE ORAL at 10:55

## 2017-06-18 NOTE — PDOC PROGRESS REPORT
Subjective


Progress Note for:: 06/18/17


Subjective:: 


No reported respiratory distress, N/V, chills nor fever. Had good BM. Appetite 

fair. Denies any pain or discomfort.





Physical Exam


Vital Signs: 


 











Temp Pulse Resp BP Pulse Ox


 


 98.0 F   80   20   130/50 H  93 


 


 06/18/17 07:27  06/18/17 07:27  06/18/17 07:27  06/18/17 07:27  06/18/17 07:27








 Intake & Output











 06/17/17 06/18/17 06/19/17





 06:59 06:59 06:59


 


Intake Total  300 


 


Balance  300 


 


Weight  76.6 kg 











General appearance: PRESENT: no acute distress, obese


Head exam: PRESENT: normocephalic


Eye exam: PRESENT: EOMI


Mouth exam: PRESENT: moist, neck supple


Neck exam: ABSENT: JVD


Respiratory exam: PRESENT: clear to auscultation elle, wheezes - mild.  ABSENT: 

rhonchi


Cardiovascular exam: PRESENT: RRR.  ABSENT: gallop


GI/Abdominal exam: PRESENT: soft.  ABSENT: distended, tenderness


Extremities exam: ABSENT: pedal edema


Neurological exam: PRESENT: alert, awake, oriented to situation


Skin exam: PRESENT: dry, warm.  ABSENT: cyanosis





Results


Laboratory Results: 


 





 06/18/17 04:48 





 06/18/17 04:48 





 











  06/17/17 06/18/17 06/18/17





  18:30 04:48 04:48


 


WBC   19.4 H 


 


RBC   3.97 L 


 


Hgb   11.7 L D 


 


Hct   35.9 L 


 


MCV   90 


 


MCH   29.4 


 


MCHC   32.6 


 


RDW   15.3 H 


 


Plt Count   137 L 


 


Seg Neutrophils %   84.9 H 


 


Lymphocytes %   8.9 L 


 


Monocytes %   5.8 


 


Eosinophils %   0.1 


 


Basophils %   0.3 


 


Absolute Neutrophils   16.5 H 


 


Absolute Lymphocytes   1.7 


 


Absolute Monocytes   1.1 


 


Absolute Eosinophils   0.0 


 


Absolute Basophils   0.1 


 


Sodium    133.9 L


 


Potassium    5.5 H


 


Chloride    101


 


Carbon Dioxide    26


 


Anion Gap    7


 


BUN    24 H


 


Creatinine    1.69 H


 


Est GFR ( Amer)    49 L


 


Est GFR (Non-Af Amer)    40 L


 


Glucose    92


 


Lactic Acid  2.4 H  


 


Calcium    8.0 L











Impressions: 


 





Chest X-Ray  06/17/17 13:53


IMPRESSION:  Few Kerley lines at the lung bases could indicate mild 

interstitial edema.  No pleural effusions or alveolar edema.  No cardiomegaly


 








Head CT  06/17/17 13:53


IMPRESSION:  No acute findings.  Small vessel white matter disease with old 

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


 








Chest/Abdomen CTA  06/17/17 15:45


IMPRESSION:  1.  No acute pulmonary embolus identified.


2.  There is some parenchymal scarring and cystic changes noted the lower lobe 

this could be represent an of the underlying pulmonary fibrosis versus new 

parenchymal scarring related to chronic obstructive pulmonary disease.  There 

is some parenchymal scarring noted throughout the right upper lobe.  Some 

cystic changes seen in both upper lobes suggestive of emphysema.  There is a 

1.6 cm nodular like opacity seen in the right upper lobe, follow-up 

recommendations of the below.  No focal consolidations.


 








Abdomen/Pelvis CT  06/17/17 15:46


IMPRESSION:  1.  No acute abnormality identified within the abdomen pelvis


2.  Aneurysmal dilatation of the infrarenal abdominal aorta measuring 3.4 x 3.3 

cm in greatest AP and transverse dimension.  Mural thrombus noted within the 

aneurysm sac.  No dissection.  SMA and celiac and renal arteries are patent.  

The DANIELA origin appears occluded and fills via collateral filling.


3.  1.7 cm nodule in the left adrenal gland.  Not properly characterized on 

this study.


 














Assessment & Plan





- Diagnosis


(1) Sepsis


Qualifiers: 


     Sepsis type: sepsis due to unspecified organism        Qualified Code(s): 

A41.9 - Sepsis, unspecified organism  


Is this a current diagnosis for this admission?: Yes





(2) Acute sinusitis


Qualifiers: 


     Sinusitis location: unspecified location     Recurrence: not specified as 

recurrent        Qualified Code(s): J01.90 - Acute sinusitis, unspecified  


Is this a current diagnosis for this admission?: Yes





(3) Acute renal failure


Qualifiers: 


     Acute renal failure type: unspecified        Qualified Code(s): N17.9 - 

Acute kidney failure, unspecified  


Is this a current diagnosis for this admission?: Yes





(4) COPD (chronic obstructive pulmonary disease)


Qualifiers: 


     COPD type: unspecified COPD        Qualified Code(s): J44.9 - Chronic 

obstructive pulmonary disease, unspecified  


Is this a current diagnosis for this admission?: Yes





(5) Senile dementia


Qualifiers: 


     Dementia behavioral disturbance: without behavioral disturbance        

Qualified Code(s): F03.90 - Unspecified dementia without behavioral disturbance

  


Is this a current diagnosis for this admission?: Yes





(6) Diabetes mellitus type 2 in nonobese


Is this a current diagnosis for this admission?: Yes





(7) Abdominal aortic aneurysm


Qualifiers: 


     Presence of rupture: without rupture        Qualified Code(s): I71.4 - 

Abdominal aortic aneurysm, without rupture  


Is this a current diagnosis for this admission?: Yes





(8) Coronary artery disease


Qualifiers: 


     Coronary Disease-Associated Artery/Lesion type: native artery     Native 

vs. transplanted heart: native heart     Associated angina: without angina     

   Qualified Code(s): I25.10 - Atherosclerotic heart disease of native coronary 

artery without angina pectoris  


Is this a current diagnosis for this admission?: Yes





(9) Essential hypertension


Is this a current diagnosis for this admission?: Yes





(10) Peripheral vascular disease


Is this a current diagnosis for this admission?: Yes








- Time


Time Spent with patient: 25-34 minutes





- Plan Summary


Plan Summary: 


Begin advair,and nebulizers. Cont. antibiotics. Re-check WBC in am. IVF to KVO.

## 2017-06-19 VITALS — SYSTOLIC BLOOD PRESSURE: 166 MMHG | DIASTOLIC BLOOD PRESSURE: 79 MMHG

## 2017-06-19 LAB
ANION GAP SERPL CALC-SCNC: 6 MMOL/L (ref 5–19)
BUN SERPL-MCNC: 20 MG/DL (ref 7–20)
CALCIUM: 8.2 MG/DL (ref 8.4–10.2)
CHLORIDE SERPL-SCNC: 98 MMOL/L (ref 98–107)
CO2 SERPL-SCNC: 26 MMOL/L (ref 22–30)
CREAT SERPL-MCNC: 1.53 MG/DL (ref 0.52–1.25)
GLUCOSE SERPL-MCNC: 96 MG/DL (ref 75–110)
POTASSIUM SERPL-SCNC: 4.8 MMOL/L (ref 3.6–5)
SODIUM SERPL-SCNC: 130.1 MMOL/L (ref 137–145)

## 2017-06-19 RX ADMIN — IPRATROPIUM BROMIDE AND ALBUTEROL SULFATE SCH ML: 2.5; .5 SOLUTION RESPIRATORY (INHALATION) at 02:12

## 2017-06-19 RX ADMIN — FLUTICASONE PROPIONATE AND SALMETEROL SCH INH: 50; 250 POWDER RESPIRATORY (INHALATION) at 09:59

## 2017-06-19 RX ADMIN — IPRATROPIUM BROMIDE AND ALBUTEROL SULFATE SCH ML: 2.5; .5 SOLUTION RESPIRATORY (INHALATION) at 08:48

## 2017-06-19 RX ADMIN — CLOPIDOGREL BISULFATE SCH MG: 75 TABLET, FILM COATED ORAL at 10:00

## 2017-06-19 RX ADMIN — CLINDAMYCIN PHOSPHATE SCH ML: 12 INJECTION, SOLUTION INTRAVENOUS at 05:15

## 2017-06-19 RX ADMIN — DOCUSATE SODIUM SCH MG: 100 CAPSULE, LIQUID FILLED ORAL at 10:00

## 2017-06-19 RX ADMIN — OXYCODONE HYDROCHLORIDE PRN MG: 5 TABLET ORAL at 05:15

## 2017-06-19 RX ADMIN — ASPIRIN SCH MG: 81 TABLET, CHEWABLE ORAL at 10:00

## 2017-06-19 RX ADMIN — LANSOPRAZOLE SCH MG: 30 TABLET, ORALLY DISINTEGRATING, DELAYED RELEASE ORAL at 05:15

## 2017-06-19 RX ADMIN — METOPROLOL SUCCINATE SCH MG: 50 TABLET, EXTENDED RELEASE ORAL at 10:00

## 2017-06-19 RX ADMIN — ENOXAPARIN SODIUM SCH MG: 40 INJECTION SUBCUTANEOUS at 09:54

## 2017-06-19 NOTE — PDOC DISCHARGE SUMMARY
General





- Admit/Disc Date/PCP


Admission Date/Primary Care Provider: 


  06/17/17 18:24





  CRYSTAL GOOD MD





Discharge Date: 06/19/17





- Discharge Diagnosis


(1) Sepsis


Is this a current diagnosis for this admission?: Yes





(2) Acute sinusitis


Is this a current diagnosis for this admission?: Yes





(3) Acute renal failure


Is this a current diagnosis for this admission?: Yes





(4) COPD (chronic obstructive pulmonary disease)


Is this a current diagnosis for this admission?: Yes





(5) Senile dementia


Is this a current diagnosis for this admission?: Yes





(6) Diabetes mellitus type 2 in nonobese


Is this a current diagnosis for this admission?: Yes





(7) Abdominal aortic aneurysm


Is this a current diagnosis for this admission?: Yes





(8) Coronary artery disease


Is this a current diagnosis for this admission?: Yes





(9) Essential hypertension


Is this a current diagnosis for this admission?: Yes





(10) Peripheral vascular disease


Is this a current diagnosis for this admission?: Yes








- Additional Information


Discharge Diet: Cardiac, Diabetic


Discharge Activity: Activity As Tolerated, Balance Activity w/Rest, Slowly 

Increase Activity


Home Medications: 








Aspirin 81 mg PO DAILY 12/29/15 


Atorvastatin Calcium [Lipitor 20 mg Tablet] 20 mg PO QHS 12/29/15 


Clopidogrel Bisulfate [Plavix] 75 mg PO DAILY 12/29/15 


Diazepam [Valium] 20 mg PO QHS 12/29/15 


Gabapentin [Neurontin] 600 mg PO QHS 12/29/15 


Lisinopril 40 mg PO DAILY 12/29/15 


Metoprolol Succinate 200 mg PO DAILY 12/29/15 


Nitroglycerin [Nitrostat 0.4 mg (1/150 Gr) Tabs 25/Bottle] 1 tab SL Q5MP PRN 12/

29/15 


Omeprazole 20 mg PO QAM 12/29/15 


Donepezil HCl 5 mg PO DAILY 06/17/17 


Oxycodone HCl 15 mg PO Q4HP PRN MDD 3 TAB 06/17/17 


Cyanocobalamin (Vitamin B-12) [Vitamin B-12 1000 mcg Tablet] 1,000 mcg PO DAILY 

06/18/17 


Fluticasone/Salmeterol [Advair 250-50 Diskus 14 Dose/Diskus] 1 inh IH BID #1 

inhaler 06/19/17 


Ipratropium/Albuterol Sulfate [Combivent Respimat 4 gm Mdi] 1 puff IH Q4 PRN #1 

aer.w.adap 06/19/17 


Levofloxacin [Levaquin 750 mg Tablet] 750 mg PO DAILY #10 tab 06/19/17 








Additional Information: 


Follow-up final blood culture results and urine culture results as outpatient 

with primary care physician in 1 week.  Stop smoking.





History of Present Illness


Patient complains of: Fall


History of Present Illness: 


MIGEL LE is a 71 year old male, w/ history of dementia, COPD, CHF has 

runny nose and sinus congestion for 3 weeks. There is mild headache.  He is not 

on the home oxygen.  He has a mild cough productive of yellowish phlegm.  There 

is no sore throat associated.  No hemoptysis.  There is no pleurisy, shortness 

of breath, dizziness or lightheadedness, but admits having some facial pressure 

intermittently.  Denies any chills or fever as well.  Earlier the day prior to 

admission patient fell on the floor but without any significant injuries.  He 

was having generalized weakness and unable to get up.  He was found by his wife 

and was brought to the hospital.  In the emergency room where workup was 

performed he has lactic acid level was mildly elevated as well as brain 

natruretic peptide.  Chest x-ray shows chronic interstitial findings.  He does 

have a history of CHF.  Emergency physician thought it was a combination of 

sepsis as well as heart failure and so antibiotic was given but no aggressive 

IV fluid management was done.  Patient did have a fever in the emergency room.  

He denies recent travel nor any insect bites.





Hospital Course


Hospital Course: 


The patient was admitted to telemetry.  The patient was started on intravenous 

antibiotics for sepsis and acute sinusitis as a cause.  Patient was hydrated 

with saline.  The following day the patient developed some chest congestion and 

wheezing probably attributed to COPD exacerbation.  He was CT scan of the chest 

is suggestive of fibrosis.  He was begun on metered-dose inhalers and 

bronchodilators and he did well the following morning.  Cultures were performed 

and today were negative.  Patient likewise had a CT of the abdomen and pelvis 

showing abdominal aortic aneurysm with reported thrombus.  Patient is not a 

candidate for chronic anticoagulation due to fall risk and dementia.  He was 

maintained on antiplatelet therapy.  The patient remained afebrile all 

throughout.  With hydration creatinine was stable.  Likely this creatinine is 

baseline on presentation.  Patient has been adamant about going home for the 

past 48 hours.  Family eventually decided to bring him back home.  Discharge 

planner was consulted for home physical therapy with home health.  They were 

advised to follow-up results of cultures and an outpatient basis.  There were 

likewise advised to return to the emergency room if symptoms recur.





Physical Exam


Vital Signs: 


 











Temp Pulse Resp BP Pulse Ox


 


 98.2 F   66   16   166/79 H  98 


 


 06/19/17 03:51  06/19/17 08:48  06/19/17 08:48  06/19/17 07:59  06/19/17 08:48








 Intake & Output











 06/18/17 06/19/17 06/20/17





 06:59 06:59 06:59


 


Intake Total 300 3158 


 


Output Total  300 


 


Balance 300 2858 


 


Weight 76.6 kg 79.1 kg 











General appearance: PRESENT: no acute distress, cooperative


Head exam: PRESENT: normocephalic


Eye exam: PRESENT: EOMI


Mouth exam: PRESENT: moist, neck supple


Neck exam: ABSENT: JVD


Respiratory exam: ABSENT: rhonchi, wheezes


Cardiovascular exam: PRESENT: RRR.  ABSENT: gallop


GI/Abdominal exam: PRESENT: normal bowel sounds, soft.  ABSENT: distended, 

tenderness


Extremities exam: ABSENT: pedal edema


Neurological exam: PRESENT: alert, awake


Skin exam: PRESENT: dry, warm.  ABSENT: cyanosis





Results


Laboratory Results: 


 





 06/18/17 04:48 





 06/19/17 04:37 





 











  06/19/17





  04:37


 


Sodium  130.1 L


 


Potassium  4.8


 


Chloride  98


 


Carbon Dioxide  26


 


Anion Gap  6


 


BUN  20


 


Creatinine  1.53 H


 


Est GFR ( Amer)  55 L


 


Est GFR (Non-Af Amer)  45 L


 


Glucose  96


 


Calcium  8.2 L











Impressions: 


 





Chest X-Ray  06/17/17 13:53


IMPRESSION:  Few Kerley lines at the lung bases could indicate mild 

interstitial edema.  No pleural effusions or alveolar edema.  No cardiomegaly


 








Head CT  06/17/17 13:53


IMPRESSION:  No acute findings.  Small vessel white matter disease with old 

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


 








Chest/Abdomen CTA  06/17/17 15:45


IMPRESSION:  1.  No acute pulmonary embolus identified.


2.  There is some parenchymal scarring and cystic changes noted the lower lobe 

this could be represent an of the underlying pulmonary fibrosis versus new 

parenchymal scarring related to chronic obstructive pulmonary disease.  There 

is some parenchymal scarring noted throughout the right upper lobe.  Some 

cystic changes seen in both upper lobes suggestive of emphysema.  There is a 

1.6 cm nodular like opacity seen in the right upper lobe, follow-up 

recommendations of the below.  No focal consolidations.


 








Abdomen/Pelvis CT  06/17/17 15:46


IMPRESSION:  1.  No acute abnormality identified within the abdomen pelvis


2.  Aneurysmal dilatation of the infrarenal abdominal aorta measuring 3.4 x 3.3 

cm in greatest AP and transverse dimension.  Mural thrombus noted within the 

aneurysm sac.  No dissection.  SMA and celiac and renal arteries are patent.  

The DANIELA origin appears occluded and fills via collateral filling.


3.  1.7 cm nodule in the left adrenal gland.  Not properly characterized on 

this study.


 














Qualifiers


**PATEINT BEING DISCHARGED WITH ANY OF THE FOLLOWING DIAGNOSIS?: No





Plan


Discharge Plan: 


Follow-up with primary care physician in 1 week.


Time Spent: Less than 30 Minutes

## 2017-12-14 ENCOUNTER — HOSPITAL ENCOUNTER (INPATIENT)
Dept: HOSPITAL 62 - ER | Age: 72
LOS: 3 days | Discharge: HOME | DRG: 305 | End: 2017-12-17
Attending: HOSPITALIST | Admitting: HOSPITALIST
Payer: MEDICARE

## 2017-12-14 DIAGNOSIS — Z88.0: ICD-10-CM

## 2017-12-14 DIAGNOSIS — Z86.73: ICD-10-CM

## 2017-12-14 DIAGNOSIS — N17.9: ICD-10-CM

## 2017-12-14 DIAGNOSIS — Z79.891: ICD-10-CM

## 2017-12-14 DIAGNOSIS — I50.22: ICD-10-CM

## 2017-12-14 DIAGNOSIS — K80.20: ICD-10-CM

## 2017-12-14 DIAGNOSIS — I16.1: Primary | ICD-10-CM

## 2017-12-14 DIAGNOSIS — F03.90: ICD-10-CM

## 2017-12-14 DIAGNOSIS — I73.9: ICD-10-CM

## 2017-12-14 DIAGNOSIS — N18.3: ICD-10-CM

## 2017-12-14 DIAGNOSIS — Z96.642: ICD-10-CM

## 2017-12-14 DIAGNOSIS — I13.0: ICD-10-CM

## 2017-12-14 DIAGNOSIS — E78.5: ICD-10-CM

## 2017-12-14 DIAGNOSIS — J44.9: ICD-10-CM

## 2017-12-14 DIAGNOSIS — F17.210: ICD-10-CM

## 2017-12-14 DIAGNOSIS — E11.22: ICD-10-CM

## 2017-12-14 DIAGNOSIS — M19.90: ICD-10-CM

## 2017-12-14 DIAGNOSIS — Z66: ICD-10-CM

## 2017-12-14 DIAGNOSIS — Z79.02: ICD-10-CM

## 2017-12-14 DIAGNOSIS — I25.10: ICD-10-CM

## 2017-12-14 DIAGNOSIS — G89.29: ICD-10-CM

## 2017-12-14 LAB
ALBUMIN SERPL-MCNC: 4.1 G/DL (ref 3.5–5)
ALP SERPL-CCNC: 116 U/L (ref 38–126)
ALT SERPL-CCNC: 35 U/L (ref 21–72)
ANION GAP SERPL CALC-SCNC: 13 MMOL/L (ref 5–19)
ANION GAP SERPL CALC-SCNC: 9 MMOL/L (ref 5–19)
APPEARANCE UR: (no result)
AST SERPL-CCNC: 17 U/L (ref 17–59)
BASE EXCESS BLDV CALC-SCNC: 5.3 MMOL/L
BASOPHILS # BLD AUTO: 0.1 10^3/UL (ref 0–0.2)
BASOPHILS NFR BLD AUTO: 0.3 % (ref 0–2)
BILIRUB DIRECT SERPL-MCNC: 0.3 MG/DL (ref 0–0.4)
BILIRUB SERPL-MCNC: 0.7 MG/DL (ref 0.2–1.3)
BILIRUB UR QL STRIP: NEGATIVE
BUN SERPL-MCNC: 21 MG/DL (ref 7–20)
BUN SERPL-MCNC: 21 MG/DL (ref 7–20)
CALCIUM: 8.9 MG/DL (ref 8.4–10.2)
CALCIUM: 9.9 MG/DL (ref 8.4–10.2)
CHLORIDE SERPL-SCNC: 103 MMOL/L (ref 98–107)
CHLORIDE SERPL-SCNC: 97 MMOL/L (ref 98–107)
CK MB SERPL-MCNC: 0.63 NG/ML (ref ?–4.55)
CO2 SERPL-SCNC: 26 MMOL/L (ref 22–30)
CO2 SERPL-SCNC: 29 MMOL/L (ref 22–30)
CREAT SERPL-MCNC: 1.62 MG/DL (ref 0.52–1.25)
CREAT SERPL-MCNC: 1.81 MG/DL (ref 0.52–1.25)
EOSINOPHIL # BLD AUTO: 0 10^3/UL (ref 0–0.6)
EOSINOPHIL NFR BLD AUTO: 0.1 % (ref 0–6)
ERYTHROCYTE [DISTWIDTH] IN BLOOD BY AUTOMATED COUNT: 14.4 % (ref 11.5–14)
GLUCOSE SERPL-MCNC: 104 MG/DL (ref 75–110)
GLUCOSE SERPL-MCNC: 175 MG/DL (ref 75–110)
GLUCOSE UR STRIP-MCNC: NEGATIVE MG/DL
HCO3 BLDV-SCNC: 30 MMOL/L (ref 20–32)
HCT VFR BLD CALC: 46.9 % (ref 37.9–51)
HGB BLD-MCNC: 15.9 G/DL (ref 13.5–17)
HGB HCT DIFFERENCE: 0.8
KETONES UR STRIP-MCNC: (no result) MG/DL
LYMPHOCYTES # BLD AUTO: 1.2 10^3/UL (ref 0.5–4.7)
LYMPHOCYTES NFR BLD AUTO: 7.6 % (ref 13–45)
MAGNESIUM SERPL-MCNC: 1.8 MG/DL (ref 1.6–2.3)
MCH RBC QN AUTO: 29.4 PG (ref 27–33.4)
MCHC RBC AUTO-ENTMCNC: 33.8 G/DL (ref 32–36)
MCV RBC AUTO: 87 FL (ref 80–97)
MONOCYTES # BLD AUTO: 0.8 10^3/UL (ref 0.1–1.4)
MONOCYTES NFR BLD AUTO: 5 % (ref 3–13)
NEUTROPHILS # BLD AUTO: 13.5 10^3/UL (ref 1.7–8.2)
NEUTS SEG NFR BLD AUTO: 87 % (ref 42–78)
NITRITE UR QL STRIP: NEGATIVE
PCO2 BLDV: 44 MMHG (ref 35–63)
PH BLDV: 7.45 [PH] (ref 7.3–7.42)
PH UR STRIP: 7 [PH] (ref 5–9)
POTASSIUM SERPL-SCNC: 3.8 MMOL/L (ref 3.6–5)
POTASSIUM SERPL-SCNC: 3.8 MMOL/L (ref 3.6–5)
PROT SERPL-MCNC: 7.2 G/DL (ref 6.3–8.2)
PROT UR STRIP-MCNC: 100 MG/DL
PROTHROMBIN TIME: 13.8 SEC (ref 11.4–15.4)
RBC # BLD AUTO: 5.4 10^6/UL (ref 4.35–5.55)
SODIUM SERPL-SCNC: 138.1 MMOL/L (ref 137–145)
SODIUM SERPL-SCNC: 138.9 MMOL/L (ref 137–145)
SP GR UR STRIP: 1.02
TROPONIN I SERPL-MCNC: 0.04 NG/ML
UROBILINOGEN UR-MCNC: NEGATIVE MG/DL (ref ?–2)
WBC # BLD AUTO: 15.5 10^3/UL (ref 4–10.5)

## 2017-12-14 PROCEDURE — 93005 ELECTROCARDIOGRAM TRACING: CPT

## 2017-12-14 PROCEDURE — 80048 BASIC METABOLIC PNL TOTAL CA: CPT

## 2017-12-14 PROCEDURE — 82550 ASSAY OF CK (CPK): CPT

## 2017-12-14 PROCEDURE — 83735 ASSAY OF MAGNESIUM: CPT

## 2017-12-14 PROCEDURE — 93010 ELECTROCARDIOGRAM REPORT: CPT

## 2017-12-14 PROCEDURE — S0119 ONDANSETRON 4 MG: HCPCS

## 2017-12-14 PROCEDURE — 96372 THER/PROPH/DIAG INJ SC/IM: CPT

## 2017-12-14 PROCEDURE — 80053 COMPREHEN METABOLIC PANEL: CPT

## 2017-12-14 PROCEDURE — 82553 CREATINE MB FRACTION: CPT

## 2017-12-14 PROCEDURE — 36415 COLL VENOUS BLD VENIPUNCTURE: CPT

## 2017-12-14 PROCEDURE — 96361 HYDRATE IV INFUSION ADD-ON: CPT

## 2017-12-14 PROCEDURE — 84484 ASSAY OF TROPONIN QUANT: CPT

## 2017-12-14 PROCEDURE — 70450 CT HEAD/BRAIN W/O DYE: CPT

## 2017-12-14 PROCEDURE — 71020: CPT

## 2017-12-14 PROCEDURE — 85610 PROTHROMBIN TIME: CPT

## 2017-12-14 PROCEDURE — 99285 EMERGENCY DEPT VISIT HI MDM: CPT

## 2017-12-14 PROCEDURE — 87086 URINE CULTURE/COLONY COUNT: CPT

## 2017-12-14 PROCEDURE — 87040 BLOOD CULTURE FOR BACTERIA: CPT

## 2017-12-14 PROCEDURE — 96376 TX/PRO/DX INJ SAME DRUG ADON: CPT

## 2017-12-14 PROCEDURE — 82803 BLOOD GASES ANY COMBINATION: CPT

## 2017-12-14 PROCEDURE — 82962 GLUCOSE BLOOD TEST: CPT

## 2017-12-14 PROCEDURE — 74176 CT ABD & PELVIS W/O CONTRAST: CPT

## 2017-12-14 PROCEDURE — 96374 THER/PROPH/DIAG INJ IV PUSH: CPT

## 2017-12-14 PROCEDURE — 83605 ASSAY OF LACTIC ACID: CPT

## 2017-12-14 PROCEDURE — 85025 COMPLETE CBC W/AUTO DIFF WBC: CPT

## 2017-12-14 PROCEDURE — 96375 TX/PRO/DX INJ NEW DRUG ADDON: CPT

## 2017-12-14 PROCEDURE — 81001 URINALYSIS AUTO W/SCOPE: CPT

## 2017-12-14 RX ADMIN — FLUTICASONE PROPIONATE AND SALMETEROL SCH INH: 50; 250 POWDER RESPIRATORY (INHALATION) at 22:07

## 2017-12-14 RX ADMIN — ATORVASTATIN CALCIUM SCH MG: 20 TABLET, FILM COATED ORAL at 22:05

## 2017-12-14 RX ADMIN — DOCUSATE SODIUM SCH MG: 100 CAPSULE, LIQUID FILLED ORAL at 17:22

## 2017-12-14 RX ADMIN — MAGNESIUM SULFATE IN DEXTROSE SCH ML: 10 INJECTION, SOLUTION INTRAVENOUS at 23:25

## 2017-12-14 RX ADMIN — HEPARIN SODIUM SCH UNIT: 5000 INJECTION, SOLUTION INTRAVENOUS; SUBCUTANEOUS at 14:20

## 2017-12-14 RX ADMIN — METOPROLOL SUCCINATE SCH MG: 50 TABLET, EXTENDED RELEASE ORAL at 22:04

## 2017-12-14 RX ADMIN — Medication SCH ML: at 14:18

## 2017-12-14 RX ADMIN — Medication SCH ML: at 22:13

## 2017-12-14 RX ADMIN — LORAZEPAM SCH MG: 0.5 TABLET ORAL at 22:02

## 2017-12-14 RX ADMIN — GABAPENTIN SCH MG: 300 CAPSULE ORAL at 22:02

## 2017-12-14 RX ADMIN — HEPARIN SODIUM SCH UNIT: 5000 INJECTION, SOLUTION INTRAVENOUS; SUBCUTANEOUS at 22:06

## 2017-12-14 NOTE — ER DOCUMENT REPORT
ED General





- General


Mode of Arrival: Medic


Information source: Patient


TRAVEL OUTSIDE OF THE [place] IN LAST 30 DAYS: No





<RM GAFFNEY - Last Filed: 12/14/17 08:50>





<KASSIE SÁNCHEZ - Last Filed: 12/14/17 15:02>





- General


Stated Complaint: GENERAL WEAKNESS


Notes: 





Patient is a 72-year-old male who presents to the emergency department today 

with complaints of generalized pain all over.  Patient is a poor historian so 

most of the history is being given by EMS.  EMS reports a BGL of 245 on arrival 

with a heart rate in the 120s.  History is limited. (RM GAFFNEY)





- Related Data


Allergies/Adverse Reactions: 


 





Penicillins Allergy (Verified 12/14/17 07:00)


 unknown reaction from a child








Home Medications: 


 Current Home Medications





Albuterol Sulfate [Proair HFA] 2 puff IN Q4HP PRN 12/14/17 [History]


Clopidogrel Bisulfate [Clopidogrel] 75 mg PO DAILY 12/14/17 [History]


Fluticasone/Salmeterol [Advair 250-50 Diskus 14 Dose/Diskus] 1 inh IH Q12HP PRN 

12/14/17 [History]


Lorazepam [Ativan 0.5 mg Tablet] 0.25 mg PO Q12 12/14/17 [History]


Ondansetron [Ondansetron Odt] 4 mg PO Q6HP PRN 12/14/17 [History]


Saccharomyces Boulardii [Florastor] 250 mg PO BID 12/14/17 [History]


Saccharomyces Boulardii [Probiotic] 250 mg PO DAILY 12/14/17 [History]











Past Medical History





- General


Information source: Patient





- Social History


Smoking Status: Never Smoker


Cigarette use (# per day): No


Frequency of alcohol use: None


Drug Abuse: None


Lives with: Family


Family History: None





- Past Medical History


Cardiac Medical History: Reports: Hx Congestive Heart Failure, Hx Coronary 

Artery Disease, Hx Heart Attack - x2, last one approx 4 yrs ago, Hx 

Hypertension - medicated, Hx Peripheral Vascular Disease


Pulmonary Medical History: Reports: Hx COPD


Endocrine Medical History: Reports: Hx Diabetes Mellitus Type 2


Past Surgical History: Reports: Hx Open Heart Surgery, Hx Orthopedic Surgery - 

left hip replacement, Other - Cataract surgery, femoral popliteal bypass





<RM GAFFNEY - Last Filed: 12/14/17 08:50>





Review of Systems





- Review of Systems


-: Yes ROS unobtainable due to patient's medical condition - poor historian, 

just complains of "pain all over"





<RM GAFFNEY - Last Filed: 12/14/17 08:50>





Physical Exam





- Vital signs


Interpretation: Hypertensive, Tachycardic





- General


General appearance: Other - Drowsy but easily arousable


In distress: None





- HEENT


Head: Normocephalic, Atraumatic


Eyes: Normal


Pupils: PERRL





- Respiratory


Respiratory status: No respiratory distress


Chest status: Nontender


Breath sounds: Normal


Chest palpation: Normal





- Cardiovascular


Rhythm: Regular, Tachycardia


Heart sounds: Normal auscultation


Murmur: No





- Abdominal


Inspection: Normal


Distension: No distension


Bowel sounds: Normal


Tenderness: Tender - Mild diffuse


Organomegaly: No organomegaly





- Extremities


General upper extremity: Normal inspection, Normal ROM


General lower extremity: Normal inspection, Normal ROM





- Neurological


Cognition: Confused


[place] Coma Scale Eye Opening: Spontaneous


[place] Coma Scale Verbal: Confused


[place] Coma Scale Motor: Obeys Commands


[place] Coma Scale Total: 14





- Psychological


Associated symptoms: Normal affect





<KASSIE SÁNCHEZ - Last Filed: 12/14/17 15:02>





- Vital signs


Vitals: 


 











Temp


 


 98.5 F 


 


 12/14/17 06:35














Course





- Laboratory


Result Diagrams: 


 12/14/17 07:05





 12/14/17 07:05





<RM GAFFNEY - Last Filed: 12/14/17 08:50>





- Laboratory


Result Diagrams: 


 12/14/17 07:05





 12/14/17 07:05





<KASSIE SÁNCHEZ - Last Filed: 12/14/17 15:02>





- Re-evaluation


Re-evalutation: 





12/14/17 08:52


Wife is now at bedside providing additional history.  Wife states that the 

patient was admitted to [facility] "Monday before last" for sepsis.  Wife 

states the patient was very hypertensive during that admission, staying around 

the 210s over 100s and it was only brought down with morphine.  Patient has a 

history of COPD and hypertension according to the wife.


 (RM GAFFNEY)








Patient with no acute findings on imaging.  Mild acute renal insufficiency.  

Patient has had hypertension.  Wife initially told me that the only thing that 

would bring it down is pain medication.  The patient  had persistent nausea.  

It only seemed to improve after the patient was given metoprolol which brought 

down his blood pressure and heart rate.  Patient is supposed to take metoprolol 

200 in the morning and did not take it today due to nausea.  I do not find any 

acute issue on imaging.  The patient has a history of dementia and is not 

helpful with history.  Patient was discussed with the hospitalist service and 

will be admitted for mild acute renal insufficiency and accelerated 

hypertension.


 (KASSIE SÁNCHEZ)





- Vital Signs


Vital signs: 


 











Temp Pulse Resp BP Pulse Ox


 


 98.5 F      14   175/100 H  95 


 


 12/14/17 06:35     12/14/17 14:16  12/14/17 14:16  12/14/17 14:16














- Laboratory


Laboratory results interpreted by me: 


 











  12/14/17 12/14/17 12/14/17





  07:04 07:05 07:05


 


WBC   15.5 H 


 


RDW   14.4 H 


 


Seg Neutrophils %   87.0 H 


 


Lymphocytes %   7.6 L 


 


Absolute Neutrophils   13.5 H 


 


VBG pH   


 


Chloride    97 L


 


BUN    21 H


 


Creatinine    1.81 H


 


Est GFR ( Amer)    45 L


 


Est GFR (Non-Af Amer)    37 L


 


Glucose    175 H


 


POC Glucose  178 H  


 


Urine Protein   


 


Urine Ketones   














  12/14/17 12/14/17





  08:16 08:58


 


WBC  


 


RDW  


 


Seg Neutrophils %  


 


Lymphocytes %  


 


Absolute Neutrophils  


 


VBG pH   7.45 H


 


Chloride  


 


BUN  


 


Creatinine  


 


Est GFR ( Amer)  


 


Est GFR (Non-Af Amer)  


 


Glucose  


 


POC Glucose  


 


Urine Protein  100 H 


 


Urine Ketones  TRACE H 














Discharge





<RM GAFFNEY - Last Filed: 12/14/17 08:50>





- Discharge


Admitting Provider: Hospitalist [facility]


Unit Admitted: IMCU





<KASSIE SÁNCHEZ - Last Filed: 12/14/17 15:02>





- Discharge


Clinical Impression: 


 Accelerated hypertension





Vomiting


Qualifiers:


 Vomiting type: unspecified Vomiting Intractability: non-intractable Nausea 

presence: with nausea Qualified Code(s): R11.2 - Nausea with vomiting, 

unspecified





Condition: Stable


Disposition: ADMITTED AS INPATIENT


Referrals: 


CRYSTAL GOOD MD [Primary Care Provider] - Follow up as needed


Scribe Attestation: 





12/14/17 15:02


I personally performed the services described in the documentation, reviewed 

and edited the documentation which was dictated to the scribe in my presence, 

and it accurately records my words and actions. (KASSIE SÁNCHEZ

## 2017-12-14 NOTE — EKG REPORT
SEVERITY:- ABNORMAL ECG -

SINUS TACHYCARDIA

VENTRICULAR BIGEMINY

PROBABLE LEFT ATRIAL ABNORMALITY

:

Confirmed by: Indira Dawkins 14-Dec-2017 07:30:55

## 2017-12-14 NOTE — RADIOLOGY REPORT (SQ)
EXAM DESCRIPTION:  CT HEAD WITHOUT



COMPLETED DATE/TIME:  12/14/2017 7:57 am



REASON FOR STUDY:  AMS, possible fall



COMPARISON:  CT brain 6/17/2017



TECHNIQUE:  Axial images acquired through the brain without intravenous contrast.  Images reviewed wi
th bone, brain and subdural windows.  Images stored on PACS.

All CT scanners at this facility use dose modulation, iterative reconstruction, and/or weight based d
osing when appropriate to reduce radiation dose to as low as reasonably achievable (ALARA).

[facility]: Dose Right  [facility]: CareDose    [facility]: Dose Right    [facility]: Teradose 4D    [facility]: Smart Technologies



RADIATION DOSE:  CT Rad equipment meets quality standard of care and radiation dose reduction techniq
ues were employed. CTDIvol: 64.6 mGy. DLP: 1163 mGy-cm. mGy.



LIMITATIONS:  None.



FINDINGS:  VENTRICLES: Normal size and contour.

CEREBRUM: No masses.  No hemorrhage.  No midline shift.  No evidence for acute infarction. Extensive 
areas of low density in the white matter most likely chronic small vessel ischemic changes.  Old lacu
george infarct in the left caudate.

CEREBELLUM: No masses.  No hemorrhage.  No alteration of density.  No evidence for acute infarction.

EXTRAAXIAL SPACES: No fluid collections.  No masses.

ORBITS AND GLOBE: No intra- or extraconal masses.  Normal contour of globe without masses.

CALVARIUM: No fracture.

PARANASAL SINUSES: Minimal debris in the inferior right maxillary sinus

SOFT TISSUES: No mass or hematoma.

OTHER: No other significant finding.



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  No acute findings.

EVIDENCE OF ACUTE STROKE: NO.



COMMENT:  Quality ID # [id number]: Final reports with documentation of one or more dose reduction techniques
 (e.g., Automated exposure control, adjustment of the mA and/or kV according to patient size, use of 
iterative reconstruction technique)



TECHNICAL DOCUMENTATION:  JOB ID:  [id number]

 2011 [employer]- All Rights Reserved

## 2017-12-14 NOTE — RADIOLOGY REPORT (SQ)
EXAM DESCRIPTION:  CT ABD/PELVIS NO ORAL OR IV



COMPLETED DATE/TIME:  12/14/2017 7:57 am



REASON FOR STUDY:  abd pain, nausea



COMPARISON:  CT abdomen pelvis 6/17/2017

CT chest 6/17/2017



TECHNIQUE:  CT scan of the abdomen and pelvis performed without intravenous or oral contrast. Images 
reviewed with lung, soft tissue, and bone windows. Reconstructed coronal and sagittal MPR images revi
ewed. All images stored on PACS.

All CT scanners at this facility use dose modulation, iterative reconstruction, and/or weight based d
osing when appropriate to reduce radiation dose to as low as reasonably achievable (ALARA).

[facility]: Dose Right  [facility]: CareDose    [facility]: Dose Right    [facility]: Teradose 4D    [facility]: Smart Technologies



RADIATION DOSE:  CT Rad equipment meets quality standard of care and radiation dose reduction techniq
ues were employed. CTDIvol: 11.6 mGy. DLP: 614 mGy-cm.mGy.



LIMITATIONS:  None.



FINDINGS:  LOWER CHEST: No significant findings. No nodules or infiltrates.

NON-CONTRASTED LIVER, SPLEEN, ADRENALS: Uncontrasted images of the liver and spleen are unremarkable.
  Right adrenal gland unremarkable.  Stable 1.7 cm left adrenal nodule with areas of fatty density, l
ikely a benign angio myelolipoma.

PANCREAS: No masses. No peripancreatic inflammatory changes.

GALLBLADDER: Single calcified stone in the gallbladder axial image 23.  No gallbladder wall thickenin
g or pericholecystic fluid.

RIGHT KIDNEY AND URETER: No suspicious masses. Assessment limited by lack of IV contrast.  1.2 cm rig
ht midpole renal cortical cyst.  No significant calcifications.   No hydronephrosis or hydroureter.

LEFT KIDNEY AND URETER: No suspicious masses. Assessment limited by lack of IV contrast.   No signifi
cant calcifications.   No hydronephrosis or hydroureter.

AORTA AND RETROPERITONEUM: Post aorto bi-iliac graft for treatment of infrarenal abdominal aortic ane
urysm.  The abdominal aorta above the level of the graft, at the level of the renal arteries measures
 3.3 cm in diameter, similar compared to 6/1/2017.  Ectasia of the bilateral common iliac arteries, j
ust distal to the aorto bi-iliac graft.  Both the right and left proximal common iliac arteries measu
re 2 cm in diameter, similar compared to 6/17/2017

BOWEL AND PERITONEAL CAVITY: No obvious masses or inflammatory changes. No free fluid.

APPENDIX: Normal.

PELVIS, BLADDER, AND ABDOMINAL WALL:No abnormal masses. No free fluid. Bladder normal.

BONES: Old left hip replacement.  Bones are osteopenic.  No lumbar compression deformity.

OTHER: No other significant finding.



IMPRESSION:  NO SIGNIFICANT OR ACUTE PROCESS IN THE ABDOMEN OR PELVIS.



COMMENT:  Quality ID # [id number]: Final reports with documentation of one or more dose reduction techniques
 (e.g., Automated exposure control, adjustment of the mA and/or kV according to patient size, use of 
iterative reconstruction technique)



TECHNICAL DOCUMENTATION:  JOB ID:  [id number]

 2011 [employer]- All Rights Reserved

## 2017-12-14 NOTE — RADIOLOGY REPORT (SQ)
EXAM DESCRIPTION:  CHEST PA/LAT



COMPLETED DATE/TIME:  12/14/2017 8:06 am



REASON FOR STUDY:  AMS, weakness



COMPARISON:  CT chest 6/17/2017

Two-view chest 2/17/2009



EXAM PARAMETERS:  NUMBER OF VIEWS: two views

TECHNIQUE: Digital Frontal and Lateral radiographic views of the chest acquired.

RADIATION DOSE: NA

LIMITATIONS: none



FINDINGS:  LUNGS AND PLEURA: No acute infiltrates.  No pleural effusion.  No pneumothorax.  Minimal s
carring right lateral costophrenic sulcus.

MEDIASTINUM AND HILAR STRUCTURES: No masses or contour abnormalities.

HEART AND VASCULAR STRUCTURES: Heart normal size.  No evidence for failure.

BONES: No acute findings.

HARDWARE: None in the chest.

OTHER: No other significant finding.



IMPRESSION:  NO SIGNIFICANT RADIOGRAPHIC FINDING IN THE CHEST.



TECHNICAL DOCUMENTATION:  JOB ID:  [id number]

 2011 [employer]- All Rights Reserved

## 2017-12-14 NOTE — HISTORY AND PHYSICAL E
History and Physical



NAME: MIGEL LE

MRN:  [id number]       : 1945   AGE: 72Y

ADMITTED: 2017                    ROOM: ED10

 

PRIMARY CARE PROVIDER:

Dr. Rome



CHIEF COMPLAINT:

Nausea and vomiting.



HISTORY OF PRESENT ILLNESS:

The patient is a 72-year-old  male with a past medical history of

hypertension.  The patient presented to the emergency department via EMS

due to having nausea and vomiting resulting in generalized abdominal pain

and diarrhea.  The patient was unable to provide much history to EMS;

however, on arrival to the emergency department, the patient was found to

have a blood glucose of 245, heart rate of 120 and a blood pressure of

177/108.  The patient was given a dose of Lopressor in the emergency

department and did have gradual improvement in his blood pressure.  The

patient was also treated for pain and nausea and the patient has had no

further vomiting or nausea since presentation.  According to the patient's

wife, the patient was admitted to [facility] the Monday before last due

to sepsis but was unable to clarify any other information.  The patient's

wife stated that the patient has very high blood pressure and at times can

get into 210/100, and some of these spikes in blood pressure are related

to his chronic pain and therefore it does improve with opiates.  The

patient himself has dementia and is not able to provide much detailed

history, however, at the time examined the patient appeared much more

comfortable and his blood pressure was improving.  The patient did have CT

of the abdomen and pelvis which revealed stable aneurysm and no other

acute process.  Given these findings and his significantly elevated blood

pressure, the patient was referred to the hospitalist for observation and

management.



PAST MEDICAL HISTORY:

1.  Cerebrovascular disease with previous CVA.

2.  Coronary artery disease with myocardial infarction x2.

3.  Hypertension.

4.  Peripheral vascular disease.

5.  Chronic systolic congestive heart failure.

6.  Chronic obstructive pulmonary disease.

7.  Vascular dementia.

8.  Diabetes mellitus type 2.

9.  Chronic kidney disease stage III with baseline creatinine of 1.6.



PAST SURGICAL HISTORY:

1.  Left hip replacement.

2.  Bilateral cataract surgery.

3.  Fem-pop bypass.

4.  AAA repair.



ALLERGIES:

PENICILLIN.



HOME MEDICATIONS:

1.  Albuterol HFA 2 puffs inhalation q.4 hours p.r.n.

2.  Aspirin 81 mg p.o. daily.

3.  Lipitor 40 mg p.o. at hour of sleep.

4.  Plavix 75 mg p.o. daily.

5.  Vitamin B12 at 1000 mcg p.o. daily.

6.  Aricept 10 mg p.o. daily.

7.  Advair 250/50 one puff inhalation q.12 hours.

8.  Neurontin 600 mg p.o. daily.

9.  Ativan 0.25 mg p.o. q.12 hours.

10. Toprol  mg p.o. daily.

11. Nitrostat 0.4 mg sublingually q.5 minutes p.r.n.

12. Omeprazole 20 mg p.o. every a.m.

13. Zofran 4 mg p.o. q.6 hours p.r.n.

14. Oxycodone 15 mg p.o. q.6 hours p.r.n.

15. Florastor 250 mg p.o. b.i.d.



SOCIAL HISTORY:

The patient currently resides at home with his wife who is also his

surrogate decision maker.  Her name is Peyton who may be reached at

[phone].  The patient does have advanced directives and is a DO NOT

RESUSCITATE and would like to continue that while inpatient.  The patient

does smoke daily but not as much as used to.  The patient has an

approximately 55-year pack history.  No alcohol use or illicit drug use.



FAMILY MEDICAL HISTORY:

The patient is uncertain of any medical problems that run in his family. 

Both parents are , assumed to be with coronary artery disease. 

The patient does have children who are healthy.  The patient has no living

siblings.  Uncertain if they had chronic medical problems.



REVIEW OF SYSTEMS:

CONSTITUTIONAL:  The patient denies any dizziness.  He does admit to

fevers, chills, weakness, and loss of appetite.



INTEGUMENTARY:  The patient denies any rash, bruising, or itching but does

admit to diaphoresis.



HEENT:  The patient denies any vision changes or hearing loss, nasal

drainage or sore throat.  No headaches.



CARDIOVASCULAR:  The patient denies any chest pain, edema, heart

palpitations.



RESPIRATORY:  The patient denies shortness of breath, cough, sputum

production or hemoptysis.



GASTROINTESTINAL:  The patient admits to nausea, vomiting, diarrhea, and

abdominal discomfort but denies any bloating, hematemesis, constipation,

melena or hematochezia.



GENITOURINARY:  Denies any hematuria, pyuria or dysuria.



MUSCULOSKELETAL:  The patient does have chronic joint pains associated

with osteoarthritis.



NEUROLOGICAL:  No seizures, tremors, or loss of consciousness.



HEMATOLOGICAL:  Denies any giancarlo bleeding or easy bruising.



ENDOCRINE:  Denies any recent weight changes.



PSYCHIATRIC:  Denies suicidal or homicidal ideations.



The rest of review of the other organ systems is negative.



PHYSICAL EXAMINATION:

GENERAL:  On examination, the patient is a frail, chronically ill

appearing 72-year-old  male who is awake, alert.  He is oriented

to person, place, time, and situation.  He is verbal, conversational, and

does not appear to be in any acute distress.



VITAL SIGNS:  Temperature 98.5, pulse 88, respirations 14, blood pressure

is 151/86, oxygen saturation is 95% on room air.



SKIN:  Warm and dry.  No rash.  He is not diaphoretic.



HEENT:  Pupils equal, round, reactive to light and accommodation. 

Conjunctivae are pink.  Sclerae nonicterus.  No mouth lesions.  Tongue is

midline.



NECK:  Supple.  There is no JVD.  No palpable lymphadenopathy or

thyromegaly.



CARDIOVASCULAR:  Heart is regular.  There is no murmur or rub.



CHEST:  Clear, symmetrical, unlabored.



ABDOMEN:  Soft, nontender, nondistended.  Bowel sounds are present.  No

palpable organomegaly.



BACK:  No CVA tenderness or sacral edema.



EXTREMITIES:  No clubbing, cyanosis, edema, or peripheral signs of

embolization.  Pedal pulses +1 noted bilaterally.



PSYCHIATRIC:  The patient is a little delayed with his dementia.



DIAGNOSTICS:

Lab values are as follows:  Hematology obtained on 2017:  WBCs are

*------*, hemoglobin is 15.9, hematocrit is 46.9, platelet count is

177,000.



Coagulation obtained on 2017:  PT is 13.8.  INR is 0.99.



Venous blood gas obtained on 2017:  The pH is 7.45, pCO2 is 44,

bicarb is 30.



Chemistry obtained on 2017:  Sodium is 138, potassium 3.8, chloride

is 97, carbon dioxide 29, BUN 21, creatinine is 1.8, glucose 175, lactic

acid 1.4, calcium is 9.9, bilirubin 0.7, AST 17, ALT of 35, alk phos 116,

troponin 0.015, total protein is 12.2, albumin 4.1.



Urinalysis obtained on 2017:  Color yellow, appearance slightly

cloudy, pH 7.0, specific gravity 1.017, protein 100, glucose negative,

ketones trace, occult blood negative, nitrate negative, bilirubin

negative, urobilinogen negative, leukocyte esterase negative, WBC 1, RBC

5, mucous rare, ascorbic acid negative.



Microbiology:  Blood and urine cultures obtained on 2017 are

pending.



EKG obtained on 2017 reveals sinus tachycardia.



CT of the abdomen and pelvis obtained on 2017 reveals no significant

acute process within the abdomen and pelvis.  Does note a 1.2 cm right

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

stable with a bilateral common iliac artery of 2 cm.



Chest x-ray obtained on 2017 reveals no significant radiographic

finding of the chest.



Head CT obtained on 2017 reveals white matter disease with old left

*------* infract.



IMPRESSION AND PLAN:

1.  Hypertensive emergency most likely due to an underlying viral process

and the patient's inability to tolerate his medications.  The patient's

nausea and vomiting are much improved.  Will continue antiemetic therapy

and follow.  Resume the patient's home blood pressure medications.

2.  Acute viral gastroenteritis.  Hopefully will run a quick course.  Will

continue antiemetic therapy.  The patient has received a bolus in the

emergency department.  The patient's creatinine is not far from baseline,

so will defer IV antibiotics for now.

3.  Cerebrovascular disease.  Will continue the patient's aspirin.

4.  Hyperlipidemia.  Will continue the patient's statin.

5.  Opiate dependency continuous.  Will continue the patient's home

medications.  Do have a suspicion that some of the patient's symptoms may

be due to opiate withdraw.  Due to his inability to keep down medications

due to his AG.



DISPOSITION:

The patient is a DO NOT RESUSCITATE/DO NOT INTUBATE.  Pending patient's

symptomatology and diagnostic findings, will reevaluate in the a.m.  Will

admit the patient to inpatient IMCU as the patient's expected length of

stay should not surpass 2 midnights.



Time spent on this admission including assessment, plan, physical

examination, patient education, family meeting, review of records is 60

minutes.









DICTATING PHYSICIAN: YUMIKO PARSONS NP





[id number]                  DT: 2017    1447

PHY#: [id number]            DD: 2017    1430

ID:   [id number]           JOB#: [id number]       ACCT: [id number]



cc:OLLIE COURTNEY M.D., MICHAEL NP

>

## 2017-12-15 LAB
CK MB SERPL-MCNC: 0.52 NG/ML (ref ?–4.55)
CK MB SERPL-MCNC: 0.58 NG/ML (ref ?–4.55)
TROPONIN I SERPL-MCNC: 0.02 NG/ML
TROPONIN I SERPL-MCNC: 0.03 NG/ML

## 2017-12-15 RX ADMIN — OXYCODONE HYDROCHLORIDE PRN MG: 5 TABLET ORAL at 06:38

## 2017-12-15 RX ADMIN — HEPARIN SODIUM SCH UNIT: 5000 INJECTION, SOLUTION INTRAVENOUS; SUBCUTANEOUS at 14:36

## 2017-12-15 RX ADMIN — Medication SCH ML: at 14:36

## 2017-12-15 RX ADMIN — DOCUSATE SODIUM SCH MG: 100 CAPSULE, LIQUID FILLED ORAL at 11:41

## 2017-12-15 RX ADMIN — OXYCODONE HYDROCHLORIDE PRN MG: 5 TABLET ORAL at 20:12

## 2017-12-15 RX ADMIN — FLUTICASONE PROPIONATE AND SALMETEROL SCH INH: 50; 250 POWDER RESPIRATORY (INHALATION) at 22:12

## 2017-12-15 RX ADMIN — LORAZEPAM SCH MG: 0.5 TABLET ORAL at 22:11

## 2017-12-15 RX ADMIN — LANSOPRAZOLE SCH MG: 15 TABLET, ORALLY DISINTEGRATING, DELAYED RELEASE ORAL at 06:38

## 2017-12-15 RX ADMIN — ASPIRIN SCH MG: 81 TABLET, CHEWABLE ORAL at 11:42

## 2017-12-15 RX ADMIN — LORAZEPAM SCH MG: 0.5 TABLET ORAL at 11:39

## 2017-12-15 RX ADMIN — HEPARIN SODIUM SCH UNIT: 5000 INJECTION, SOLUTION INTRAVENOUS; SUBCUTANEOUS at 06:39

## 2017-12-15 RX ADMIN — DOCUSATE SODIUM SCH MG: 100 CAPSULE, LIQUID FILLED ORAL at 17:50

## 2017-12-15 RX ADMIN — METOPROLOL SUCCINATE SCH MG: 50 TABLET, EXTENDED RELEASE ORAL at 22:11

## 2017-12-15 RX ADMIN — CLOPIDOGREL BISULFATE SCH MG: 75 TABLET, FILM COATED ORAL at 11:43

## 2017-12-15 RX ADMIN — METOPROLOL SUCCINATE SCH MG: 50 TABLET, EXTENDED RELEASE ORAL at 11:42

## 2017-12-15 RX ADMIN — Medication SCH ML: at 22:13

## 2017-12-15 RX ADMIN — HEPARIN SODIUM SCH UNIT: 5000 INJECTION, SOLUTION INTRAVENOUS; SUBCUTANEOUS at 22:12

## 2017-12-15 RX ADMIN — OXYCODONE HYDROCHLORIDE PRN MG: 5 TABLET ORAL at 01:06

## 2017-12-15 RX ADMIN — MAGNESIUM SULFATE IN DEXTROSE SCH ML: 10 INJECTION, SOLUTION INTRAVENOUS at 00:42

## 2017-12-15 RX ADMIN — Medication SCH ML: at 06:52

## 2017-12-15 RX ADMIN — ATORVASTATIN CALCIUM SCH MG: 20 TABLET, FILM COATED ORAL at 22:11

## 2017-12-15 RX ADMIN — GABAPENTIN SCH MG: 300 CAPSULE ORAL at 22:11

## 2017-12-15 RX ADMIN — FLUTICASONE PROPIONATE AND SALMETEROL SCH INH: 50; 250 POWDER RESPIRATORY (INHALATION) at 11:43

## 2017-12-15 RX ADMIN — DONEPEZIL HYDROCHLORIDE SCH MG: 5 TABLET, FILM COATED ORAL at 11:41

## 2017-12-15 RX ADMIN — ONDANSETRON PRN MG: 4 TABLET, ORALLY DISINTEGRATING ORAL at 20:15

## 2017-12-15 RX ADMIN — CYANOCOBALAMIN TAB 1000 MCG SCH MCG: 1000 TAB at 11:40

## 2017-12-15 NOTE — PSYCHOLOGICAL NOTE
Psych Note





- Psych Note


Psych Note: 


Patient is a 72-year-old male who presents to the emergency department today 

with complaints of generalized pain all over.  Patient is a poor historian so 

most of the history is being given by EMS.





Psychiatric consultation was requested because the patient's wife disclosed 

suicidal act prior to arrival to [facility].  Patient has a diagnosis and takes 

medications for vascular dementia.  





Clinician asked patient and patient wife on event prior to arriving to [facility] ED.  

Patient's wife confirmed the patient made a suicidal gesture.  Patient 

questioned "you mean about the gun?"  Patient's wife confirmed this is what she 

was speaking about stating that it happened Wednesday evening.  Patient asked 

"yesterday?...  I do not think so... Oh wait yesterday was Thursday, today is 

Friday."  Patient disclosed that he took the gun out "cocked it and put it to 

my heart"  he continued to state that he did not pull the trigger because "I 

did not have the strength or guts."  Patient's wife states that she does not 

like seeing her  in this condition but that she cannot live without him.

  They have no children no living relatives, and really only keep to 

themselves.  She continued to state that they have always said "quality of life 

is more important than quantity."  It has been very difficult because patient 

has been in so much pain and very depressed.  Patient states he can barely 

walk.  She disclosed that [facility] however cannot make any progress with it.  She states that she 

just needs "somebody to help focus on fixing his food in him eating."  She 

confirms the patient no longer has access to any more weapons and that the gun 

was unloaded at the time the patient held it.  She continued to state that he 

has trouble sleeping and has nightmares.  She is concerned because the doctors 

want to have surgery on his gallbladder; however, when they were in [place] 

they were told that it would not help.  She is concerned on the recuperation 

after the surgery.  Clinician asked the patient if he wanted to have surgery he 

stated "I am up for anything that can get me better."





Clinician preformed mini mental status was within normal limits, he answered 

questions of  abstract thinking and higher cortical processes with accuracy.





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 

major depressive-like episode


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 

behavioral disturbances





Impression/Plan: Patient is considered psychiatrically clear.  Patient does not 

meet IVC criteria per [place] GS 122C.  While patient does disclose suicidal 

ideation patient does not have the means to carry out.  Patient's wife has 

secured all weapons in the home and patient is unable to ambulate.  Additionally

, the patient currently does not have the physical strength to carry out any 

plan.  Patient has a diagnosis and takes medications for vascular dementia and 

does disclose depression resulting from his medical decline.  Placement in an 

inpatient psychiatric facility would be inappropriate for this patient.


Medication recommendations per the psychiatric provider with [facility] are as follows:


D/C Home Ativan


Start Buspar 5 mg QAM and 10 mg QPM


Please also note, antispychotics and benzodiazepines serve to increase symptoms 

in neurocognitive disorders, to include increased irritability, psychosis, 

aggression etc.  This is a research based, which is the reason for the 

recommendation of discontinuing the Ativan. 





Thank you kindly for this consultation request.

## 2017-12-15 NOTE — PDOC PROGRESS REPORT
Subjective


Progress Note for:: 12/15/17


Subjective:: 


Does not appear to be septic blood or urine cultures done on 14 December showed 

no growth for 24 hours, negative white count, discussed case with Dr. Oliveira 

and we reviewed his medical records and old charts.  Patient has several small 

gallstones.  Wife at bedside very tearful stating she cannot take care of him 

at home anymore due to his dementia.  Patient and wife both state he tried to 

kill himself with a gun the day before he came into the hospital.  Asked 

patient why he wanted to kill himself he said he was tired of living in misery 

and pain.  





Reason For Visit: 


HYPERTENSIVE EMERGENCY patient's wife states she is unable to take care of him 

at home anymore due to his mind and memory.  The day before patient comes to 

the ER he did have a gun in his home in wife and patient both the knowledge 

that he would was going to kill himself because he did not want to live like 

this anymore.  Wife says she cannot live with that her and we need to help get 

some assistance for him.








Physical Exam


Vital Signs: 


 











Temp Pulse Resp BP Pulse Ox


 


 98.9 F   85   20   173/83 H  93 


 


 12/15/17 08:15  12/15/17 08:15  12/15/17 08:15  12/15/17 08:15  12/15/17 08:15








 Intake & Output











 12/14/17 12/15/17 12/16/17





 06:59 06:59 06:59


 


Intake Total  430 


 


Output Total  1 


 


Balance  429 


 


Weight  72.5 kg 











General appearance: PRESENT: no acute distress, well-developed, well-nourished


Head exam: PRESENT: atraumatic, normocephalic


Eye exam: PRESENT: conjunctiva pink, EOMI, PERRLA.  ABSENT: scleral icterus


Ear exam: PRESENT: normal external ear exam


Mouth exam: PRESENT: moist, tongue midline


Neck exam: ABSENT: carotid bruit, JVD, lymphadenopathy, thyromegaly


Respiratory exam: PRESENT: clear to auscultation elle.  ABSENT: rales, rhonchi, 

wheezes


Cardiovascular exam: PRESENT: RRR.  ABSENT: diastolic murmur, rubs, systolic 

murmur


Pulses: PRESENT: normal dorsalis pedis pul


Vascular exam: PRESENT: normal capillary refill


GI/Abdominal exam: PRESENT: normal bowel sounds, soft.  ABSENT: distended, 

guarding, mass, organolmegaly, rebound, tenderness


Rectal exam: PRESENT: deferred


Extremities exam: PRESENT: full ROM.  ABSENT: calf tenderness, clubbing, pedal 

edema


Neurological exam: PRESENT: alert, awake, oriented to person, oriented to place

, oriented to time, oriented to situation, CN II-XII grossly intact.  ABSENT: 

motor sensory deficit


Psychiatric exam: PRESENT: appropriate affect, normal mood.  ABSENT: homicidal 

ideation, suicidal ideation


Skin exam: PRESENT: dry, intact, warm.  ABSENT: cyanosis, rash





Results


Laboratory Results: 


 





 12/14/17 21:45 





 











  12/14/17





  21:45


 


Sodium  138.1


 


Potassium  3.8


 


Chloride  103


 


Carbon Dioxide  26


 


Anion Gap  9


 


BUN  21 H


 


Creatinine  1.62 H


 


Est GFR ( Amer)  51 L


 


Est GFR (Non-Af Amer)  42 L


 


Glucose  104


 


Calcium  8.9


 


Magnesium  1.8








 











  12/14/17 12/14/17 12/15/17





  21:45 21:45 03:51


 


Creatine Kinase  24 L   < 20 L


 


CK-MB (CK-2)   0.63 


 


Troponin I   0.041 














  12/15/17 12/15/17 12/15/17





  03:51 10:10 10:10


 


Creatine Kinase   26 L 


 


CK-MB (CK-2)  0.52   0.58


 


Troponin I  0.031   0.019











Impressions: 


 





Abdomen/Pelvis CT  12/14/17 06:44


IMPRESSION:  NO SIGNIFICANT OR ACUTE PROCESS IN THE ABDOMEN OR PELVIS.


 








Chest X-Ray  12/14/17 06:44


IMPRESSION:  NO SIGNIFICANT RADIOGRAPHIC FINDING IN THE CHEST.


 








Head CT  12/14/17 06:44


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  No acute 

findings.


EVIDENCE OF ACUTE STROKE: NO.


 














Assessment & Plan





- Diagnosis


(1) Gallstone


Qualifiers: 


   Cholecystitis presence: with cholecystitis   Biliary obstruction: without 

biliary obstruction 


Is this a current diagnosis for this admission?: Yes   


Plan: 


Surgical consult for evaluation of cholecystectomy








(2) Acute renal failure


Qualifiers: 


   Acute renal failure type: with acute tubular necrosis   Qualified Code(s): 

N17.0 - Acute kidney failure with tubular necrosis   


Is this a current diagnosis for this admission?: Yes   


Plan: 


Suspect this patient would benefit from Elizabeth cystectomy.  This is his third 

fourth hospitalization in the very short period of time.  He has been evaluated 

with chest pain, abdominal pain, nausea vomiting, right upper quadrant 

tenderness.  He has documented gallstones due to his age, diabetes, despite 

that he does not have elevated LFTs at this time or a dilated that does not 

mean he does not have an acute cholecystitis which personally I would recommend 

his gallbladder removed for he become septic and die.














(5) Gallstones without obstruction of gallbladder


Qualifiers: 


   Cholelithiasis location: gallbladder   Cholecystitis presence: without 

cholecystitis   Qualified Code(s): K80.20 - Calculus of gallbladder without 

cholecystitis without obstruction   


Is this a current diagnosis for this admission?: Yes   


Plan: 


I personally recommend this patient have his gallbladder removed 

laparoscopically








(6) Accelerated hypertension


Is this a current diagnosis for this admission?: Yes   





(7) Essential hypertension


Is this a current diagnosis for this admission?: Yes   








(9) Senile dementia


Qualifiers: 


   Dementia behavioral disturbance: without behavioral disturbance   Qualified 

Code(s): F03.90 - Unspecified dementia without behavioral disturbance   


Plan: 


Continue patient on dementia medications.  Psych consult with the sitter to 

evaluate this recent incident of suicidal ideation.  Feel like his wife is able 

to make healthcare decisions on his behalf.  However patient has a high 

functioning level with a history of dementia so I do personally think he still 

a good candidate for surgery at this point








(10) Sepsis


Qualifiers: 


   Sepsis type: sepsis due to unspecified organism   Qualified Code(s): A41.9 - 

Sepsis, unspecified organism   





- Plan Summary


Plan Summary: 





I have consulted Dr. Garcia surgeon who is aware of his situation.  Since 

patient ate today he will be n.p.o. after midnight tonight.  Dr. Vargas will 

evaluate him again in the morning for possible surgery.

## 2017-12-15 NOTE — PDOC CONSULTATION
Consultation


Consult Date: 12/15/17


Consult reason:: gallstones





History of Present Illness


Admission Date/PCP: 


  17 13:28





  OLLIE COURTNEY MD





History of Present Illness: 


MIGEL LE is a 72 year old male


Patient just discharged from [facility] and records at [facility] are in patient's 

chart. These records were reviewed. He has gallstones on ultrasound without 

gallbladder wall thickening or pericholecystic fluid. He also had a HIDA scan 

which was normal. Pt denies abdominal pains.





Past Medical History


Cardiac Medical History: Reports: Congestive Heart Failure, Coronary Artery 

Disease, Myocardial Infarction - x2, last one approx 4 yrs ago, Hypertension - 

medicated, Peripheral Vascular Disease


Pulmonary Medical History: Reports: Chronic Obstructive Pulmonary Disease (COPD)


   Denies: Asthma


Neurological Medical History: 


   Denies: Seizures


Endocrine Medical History: Reports: Diabetes Mellitus Type 2


GI Medical History: 


   Denies: Hepatitis, Hiatal Hernia


Psychiatric Medical History: Reports: Depression


Hematology: 


   Denies: Anemia, Sickle Cell Disease





Past Surgical History


Past Surgical History: Reports: Orthopedic Surgery - left hip replacement, 

Other - Cataract surgery, femoral popliteal bypass


   Denies: Pacemaker





Social History


Lives with: Family


Smoking Status: Current Every Day Smoker


Cigarettes Packs Per Day: 1


Number of Years Smokin


Frequency of Alcohol Use: None


Hx Recreational Drug Use: No


Drugs: None


Hx Prescription Drug Abuse: No





- Advance Directive


Resuscitation Status: Do Not Resuscitate





Family History


Family History: None


Parental Family History Reviewed: Yes


Children Family History Reviewed: No


Sibling(s) Family History Reviewed.: No





Medication/Allergy


Home Medications: 








Aspirin 81 mg PO DAILY 12/29/15 


Atorvastatin Calcium [Lipitor 20 mg Tablet] 40 mg PO QHS 12/29/15 


Gabapentin [Neurontin] 600 mg PO QHS 12/29/15 


Metoprolol Succinate 200 mg PO DAILY 12/29/15 


Nitroglycerin [Nitrostat 0.4 mg (1/150 Gr) Tabs 25/Bottle] 1 tab SL Q5MP PRN 12/

29/15 


Omeprazole 20 mg PO QAM 12/29/15 


Donepezil HCl 10 mg PO DAILY 17 


Oxycodone HCl 15 mg PO Q6HP PRN 17 


Cyanocobalamin (Vitamin B-12) [Vitamin B-12 1000 mcg Tablet] 1,000 mcg PO DAILY 

17 


Levofloxacin [Levaquin 750 mg Tablet] 750 mg PO DAILY #10 tab 17 


Albuterol Sulfate [Proair HFA] 2 puff IN Q4HP PRN 17 


Clopidogrel Bisulfate [Clopidogrel] 75 mg PO DAILY 17 


Fluticasone/Salmeterol [Advair 250-50 Diskus 14 Dose/Diskus] 1 inh IH Q12HP PRN 

17 


Lorazepam [Ativan 0.5 mg Tablet] 0.25 mg PO Q12 17 


Ondansetron [Ondansetron Odt] 4 mg PO Q6HP PRN 17 


Saccharomyces Boulardii [Florastor] 250 mg PO BID 17 


Saccharomyces Boulardii [Probiotic] 250 mg PO DAILY 17 








Allergies/Adverse Reactions: 


 





Penicillins Allergy (Verified 17 07:00)


 unknown reaction from a child











Review of Systems


Constitutional: PRESENT: other - no fever/chills


Eyes: PRESENT: other - no visual/hearing problems


Nose, Mouth, and Throat: PRESENT: other - no sore throat


Cardiovascular: PRESENT: other - no chest pains


Respiratory: PRESENT: other - no cough


Gastrointestinal: PRESENT: other - no abdominal pains


Musculoskeletal: PRESENT: other - no joint swelling


Integumentary: PRESENT: other - no rash


Neurological: PRESENT: other - no seizures


Psychiatric: PRESENT: anxiety


Endocrine: PRESENT: other - no polyuria/polydipsia


Hematologic/Lymphatic: PRESENT: other - no easy bruisability





Physical Exam


Vital Signs: 


 











Temp Pulse Resp BP Pulse Ox


 


 98.9 F   85   20   173/83 H  93 


 


 12/15/17 08:15  12/15/17 08:15  12/15/17 08:15  12/15/17 08:15  12/15/17 08:15








 Intake & Output











 12/14/17 12/15/17 12/16/17





 06:59 06:59 06:59


 


Intake Total  430 


 


Output Total  1 


 


Balance  429 


 


Weight  72.5 kg 











General appearance: PRESENT: no acute distress


Head exam: PRESENT: atraumatic


Eye exam: PRESENT: conjunctiva pink


Ear exam: PRESENT: normal external ear exam


Mouth exam: PRESENT: moist, tongue midline


Neck exam: PRESENT: full ROM


Respiratory exam: PRESENT: clear to auscultation elle


Cardiovascular exam: PRESENT: RRR


Pulses: PRESENT: normal radial pulses


Vascular exam: PRESENT: normal capillary refill


GI/Abdominal exam: PRESENT: soft - nontender


Rectal exam: PRESENT: deferred


Extremities exam: PRESENT: full ROM


Musculoskeletal exam: PRESENT: full ROM


Neurological exam: PRESENT: alert, oriented to person, oriented to place, 

oriented to time, oriented to situation


Psychiatric exam: PRESENT: anxious


Focused psych exam: PRESENT: restlessness


Skin exam: PRESENT: normal color, warm





Results


Laboratory Results: 


 





 17 21:45 





 











  17





  21:45


 


Sodium  138.1


 


Potassium  3.8


 


Chloride  103


 


Carbon Dioxide  26


 


Anion Gap  9


 


BUN  21 H


 


Creatinine  1.62 H


 


Est GFR ( Amer)  51 L


 


Est GFR (Non-Af Amer)  42 L


 


Glucose  104


 


Calcium  8.9


 


Magnesium  1.8








 











  12/14/17 12/14/17 12/15/17





  21:45 21:45 03:51


 


Creatine Kinase  24 L   < 20 L


 


CK-MB (CK-2)   0.63 


 


Troponin I   0.041 














  12/15/17 12/15/17 12/15/17





  03:51 10:10 10:10


 


Creatine Kinase   26 L 


 


CK-MB (CK-2)  0.52   0.58


 


Troponin I  0.031   0.019











Impressions: 


 





Abdomen/Pelvis CT  17 06:44


IMPRESSION:  NO SIGNIFICANT OR ACUTE PROCESS IN THE ABDOMEN OR PELVIS.


 








Chest X-Ray  17 06:44


IMPRESSION:  NO SIGNIFICANT RADIOGRAPHIC FINDING IN THE CHEST.


 








Head CT  17 06:44


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  No acute 

findings.


EVIDENCE OF ACUTE STROKE: NO.


 














Assessment & Plan





- Time


Time Spent: 30 to 50 Minutes





- Plan Summary


Plan Summary: 





Has aymptomatic gallstones. In view of US findings of no cholecystitis and 

normal HIDA scan, I don't think he needs his gallbladder removed at this time.

## 2017-12-16 LAB
ALBUMIN SERPL-MCNC: 3 G/DL (ref 3.5–5)
ALP SERPL-CCNC: 84 U/L (ref 38–126)
ALT SERPL-CCNC: 31 U/L (ref 21–72)
ANION GAP SERPL CALC-SCNC: 11 MMOL/L (ref 5–19)
AST SERPL-CCNC: 16 U/L (ref 17–59)
BASOPHILS # BLD AUTO: 0 10^3/UL (ref 0–0.2)
BASOPHILS NFR BLD AUTO: 0.5 % (ref 0–2)
BILIRUB DIRECT SERPL-MCNC: 0.2 MG/DL (ref 0–0.4)
BILIRUB SERPL-MCNC: 0.6 MG/DL (ref 0.2–1.3)
BUN SERPL-MCNC: 29 MG/DL (ref 7–20)
CALCIUM: 8.4 MG/DL (ref 8.4–10.2)
CHLORIDE SERPL-SCNC: 102 MMOL/L (ref 98–107)
CO2 SERPL-SCNC: 24 MMOL/L (ref 22–30)
CREAT SERPL-MCNC: 1.61 MG/DL (ref 0.52–1.25)
EOSINOPHIL # BLD AUTO: 0.1 10^3/UL (ref 0–0.6)
EOSINOPHIL NFR BLD AUTO: 0.9 % (ref 0–6)
ERYTHROCYTE [DISTWIDTH] IN BLOOD BY AUTOMATED COUNT: 14.4 % (ref 11.5–14)
GLUCOSE SERPL-MCNC: 143 MG/DL (ref 75–110)
HCT VFR BLD CALC: 40.6 % (ref 37.9–51)
HGB BLD-MCNC: 13.8 G/DL (ref 13.5–17)
HGB HCT DIFFERENCE: 0.8
LYMPHOCYTES # BLD AUTO: 1.9 10^3/UL (ref 0.5–4.7)
LYMPHOCYTES NFR BLD AUTO: 18.1 % (ref 13–45)
MCH RBC QN AUTO: 29.4 PG (ref 27–33.4)
MCHC RBC AUTO-ENTMCNC: 33.9 G/DL (ref 32–36)
MCV RBC AUTO: 87 FL (ref 80–97)
MONOCYTES # BLD AUTO: 0.6 10^3/UL (ref 0.1–1.4)
MONOCYTES NFR BLD AUTO: 5.7 % (ref 3–13)
NEUTROPHILS # BLD AUTO: 7.7 10^3/UL (ref 1.7–8.2)
NEUTS SEG NFR BLD AUTO: 74.8 % (ref 42–78)
POTASSIUM SERPL-SCNC: 4.1 MMOL/L (ref 3.6–5)
PROT SERPL-MCNC: 5.4 G/DL (ref 6.3–8.2)
RBC # BLD AUTO: 4.68 10^6/UL (ref 4.35–5.55)
SODIUM SERPL-SCNC: 136.6 MMOL/L (ref 137–145)
WBC # BLD AUTO: 10.3 10^3/UL (ref 4–10.5)

## 2017-12-16 RX ADMIN — ATORVASTATIN CALCIUM SCH MG: 20 TABLET, FILM COATED ORAL at 23:10

## 2017-12-16 RX ADMIN — HEPARIN SODIUM SCH UNIT: 5000 INJECTION, SOLUTION INTRAVENOUS; SUBCUTANEOUS at 22:55

## 2017-12-16 RX ADMIN — METOPROLOL SUCCINATE SCH MG: 50 TABLET, EXTENDED RELEASE ORAL at 09:53

## 2017-12-16 RX ADMIN — HEPARIN SODIUM SCH UNIT: 5000 INJECTION, SOLUTION INTRAVENOUS; SUBCUTANEOUS at 06:50

## 2017-12-16 RX ADMIN — OXYCODONE HYDROCHLORIDE PRN MG: 5 TABLET ORAL at 01:48

## 2017-12-16 RX ADMIN — OXYCODONE HYDROCHLORIDE PRN MG: 5 TABLET ORAL at 06:50

## 2017-12-16 RX ADMIN — ASPIRIN SCH MG: 81 TABLET, CHEWABLE ORAL at 09:52

## 2017-12-16 RX ADMIN — DONEPEZIL HYDROCHLORIDE SCH MG: 5 TABLET, FILM COATED ORAL at 09:53

## 2017-12-16 RX ADMIN — LORAZEPAM SCH MG: 0.5 TABLET ORAL at 23:09

## 2017-12-16 RX ADMIN — DOCUSATE SODIUM SCH MG: 100 CAPSULE, LIQUID FILLED ORAL at 09:50

## 2017-12-16 RX ADMIN — Medication SCH ML: at 15:14

## 2017-12-16 RX ADMIN — OXYCODONE HYDROCHLORIDE PRN MG: 5 TABLET ORAL at 18:33

## 2017-12-16 RX ADMIN — Medication SCH ML: at 06:50

## 2017-12-16 RX ADMIN — FLUTICASONE PROPIONATE AND SALMETEROL SCH INH: 50; 250 POWDER RESPIRATORY (INHALATION) at 09:54

## 2017-12-16 RX ADMIN — DOCUSATE SODIUM SCH MG: 100 CAPSULE, LIQUID FILLED ORAL at 18:33

## 2017-12-16 RX ADMIN — Medication SCH ML: at 23:11

## 2017-12-16 RX ADMIN — CLOPIDOGREL BISULFATE SCH MG: 75 TABLET, FILM COATED ORAL at 09:52

## 2017-12-16 RX ADMIN — OXYCODONE HYDROCHLORIDE PRN MG: 5 TABLET ORAL at 12:07

## 2017-12-16 RX ADMIN — LANSOPRAZOLE SCH MG: 15 TABLET, ORALLY DISINTEGRATING, DELAYED RELEASE ORAL at 06:50

## 2017-12-16 RX ADMIN — GABAPENTIN SCH MG: 300 CAPSULE ORAL at 23:10

## 2017-12-16 RX ADMIN — CYANOCOBALAMIN TAB 1000 MCG SCH MCG: 1000 TAB at 09:52

## 2017-12-16 RX ADMIN — METOPROLOL SUCCINATE SCH MG: 50 TABLET, EXTENDED RELEASE ORAL at 23:11

## 2017-12-16 RX ADMIN — HEPARIN SODIUM SCH UNIT: 5000 INJECTION, SOLUTION INTRAVENOUS; SUBCUTANEOUS at 15:12

## 2017-12-16 RX ADMIN — ONDANSETRON PRN MG: 4 TABLET, ORALLY DISINTEGRATING ORAL at 18:32

## 2017-12-16 RX ADMIN — LORAZEPAM SCH MG: 0.5 TABLET ORAL at 09:53

## 2017-12-17 VITALS — SYSTOLIC BLOOD PRESSURE: 163 MMHG | DIASTOLIC BLOOD PRESSURE: 87 MMHG

## 2017-12-17 RX ADMIN — FLUTICASONE PROPIONATE AND SALMETEROL SCH INH: 50; 250 POWDER RESPIRATORY (INHALATION) at 06:14

## 2017-12-17 RX ADMIN — OXYCODONE HYDROCHLORIDE PRN MG: 5 TABLET ORAL at 05:26

## 2017-12-17 RX ADMIN — CYANOCOBALAMIN TAB 1000 MCG SCH MCG: 1000 TAB at 10:06

## 2017-12-17 RX ADMIN — OXYCODONE HYDROCHLORIDE PRN MG: 5 TABLET ORAL at 00:01

## 2017-12-17 RX ADMIN — DOCUSATE SODIUM SCH MG: 100 CAPSULE, LIQUID FILLED ORAL at 10:07

## 2017-12-17 RX ADMIN — LANSOPRAZOLE SCH MG: 15 TABLET, ORALLY DISINTEGRATING, DELAYED RELEASE ORAL at 05:21

## 2017-12-17 RX ADMIN — ASPIRIN SCH MG: 81 TABLET, CHEWABLE ORAL at 10:05

## 2017-12-17 RX ADMIN — FLUTICASONE PROPIONATE AND SALMETEROL SCH INH: 50; 250 POWDER RESPIRATORY (INHALATION) at 10:07

## 2017-12-17 RX ADMIN — METOPROLOL SUCCINATE SCH MG: 50 TABLET, EXTENDED RELEASE ORAL at 10:06

## 2017-12-17 RX ADMIN — LORAZEPAM SCH MG: 0.5 TABLET ORAL at 10:05

## 2017-12-17 RX ADMIN — Medication SCH ML: at 05:31

## 2017-12-17 RX ADMIN — HEPARIN SODIUM SCH UNIT: 5000 INJECTION, SOLUTION INTRAVENOUS; SUBCUTANEOUS at 05:31

## 2017-12-17 RX ADMIN — DONEPEZIL HYDROCHLORIDE SCH MG: 5 TABLET, FILM COATED ORAL at 10:06

## 2017-12-17 RX ADMIN — CLOPIDOGREL BISULFATE SCH MG: 75 TABLET, FILM COATED ORAL at 10:05

## 2017-12-17 NOTE — PDOC PROGRESS REPORT
Subjective


Progress Note for:: 12/16/17


Subjective:: 





Patient reports he is feeling better and would like to go home. He has no new 

complaints.





Patient denies chest pain, shortness of breath, abdominal pain, nausea, vomiting

, fevers, chills, diarrhea, constipation, headache, new onset weakness.


Reason For Visit: 


ACUTE RENAL FAILURE, ACCELERATED HTN








Physical Exam


Vital Signs: 


 











Temp Pulse Resp BP Pulse Ox


 


 99.0 F   75   20   148/73 H  95 


 


 12/16/17 20:12  12/16/17 20:12  12/16/17 20:12  12/16/17 20:12  12/16/17 20:12








 Intake & Output











 12/15/17 12/16/17 12/17/17





 06:59 06:59 06:59


 


Intake Total 


 


Output Total 1 250 


 


Balance 


 


Weight 72.5 kg 72.6 kg 











Exam: 





General: Awake alert and oriented x3, no acute respiratory distress


HEENT: AT/NC, PERRL, EOMI, oropharynx is not dry, pink, no scleral icterus, no 

conjunctival injection


Neck: No JVD, trachea midline


Chest: Clear to auscultation bilaterally, no wheezes rhonchi or rales


CV: Regular rate and rhythm, normal S1 and S2, no murmur, rub, or gallop


Abdomen: Soft, nontender to palpation, nondistended, active bowel sounds; no 

rebound, rigidity, or guarding


Extremities: No cyanosis, clubbing or edema


Neuro: Cranial nerves II through XII are grossly intact without focal deficits; 

awake alert and oriented x3


Psych: Normal mood and affect; slightly blunted





Results


Laboratory Results: 


 





 12/16/17 19:55 





 12/16/17 19:55 





 











  12/16/17 12/16/17





  19:55 19:55


 


WBC  10.3 


 


RBC  4.68 


 


Hgb  13.8  D 


 


Hct  40.6 


 


MCV  87 


 


MCH  29.4 


 


MCHC  33.9 


 


RDW  14.4 H 


 


Plt Count  146 L 


 


Seg Neutrophils %  74.8 


 


Lymphocytes %  18.1 


 


Monocytes %  5.7 


 


Eosinophils %  0.9 


 


Basophils %  0.5 


 


Absolute Neutrophils  7.7 


 


Absolute Lymphocytes  1.9 


 


Absolute Monocytes  0.6 


 


Absolute Eosinophils  0.1 


 


Absolute Basophils  0.0 


 


Sodium   136.6 L


 


Potassium   4.1


 


Chloride   102


 


Carbon Dioxide   24


 


Anion Gap   11


 


BUN   29 H


 


Creatinine   1.61 H


 


Est GFR ( Amer)   52 L


 


Est GFR (Non-Af Amer)   43 L


 


Glucose   143 H


 


Calcium   8.4


 


Total Bilirubin   0.6


 


AST   16 L


 


ALT   31


 


Alkaline Phosphatase   84


 


Total Protein   5.4 L


 


Albumin   3.0 L








 











  12/14/17 12/14/17 12/15/17





  21:45 21:45 03:51


 


Creatine Kinase  24 L   < 20 L


 


CK-MB (CK-2)   0.63 


 


Troponin I   0.041 














  12/15/17 12/15/17 12/15/17





  03:51 10:10 10:10


 


Creatine Kinase   26 L 


 


CK-MB (CK-2)  0.52   0.58


 


Troponin I  0.031   0.019











Impressions: 


 





Abdomen/Pelvis CT  12/14/17 06:44


IMPRESSION:  NO SIGNIFICANT OR ACUTE PROCESS IN THE ABDOMEN OR PELVIS.


 








Chest X-Ray  12/14/17 06:44


IMPRESSION:  NO SIGNIFICANT RADIOGRAPHIC FINDING IN THE CHEST.


 








Head CT  12/14/17 06:44


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  No acute 

findings.


EVIDENCE OF ACUTE STROKE: NO.


 














Assessment & Plan





- Diagnosis


(1) Acute renal failure


Qualifiers: 


   Acute renal failure type: with acute tubular necrosis   Qualified Code(s): 

N17.0 - Acute kidney failure with tubular necrosis   


Is this a current diagnosis for this admission?: Yes   


Plan: 


Patient appears to be near his baseline.  Will continue with IV fluids for 

tonight to see if this will improve slightly.





 











  06/17/17 06/18/17 06/19/17





  14:03 04:48 04:37


 


Creatinine  1.59 H  1.69 H  1.53 H














  12/14/17 12/14/17 12/16/17





  07:05 21:45 19:55


 


Creatinine  1.81 H  1.62 H  1.61 H





This was likely secondary to intravascular volume depletion from acute viral 

gastroenteritis.








(2) Accelerated hypertension


Is this a current diagnosis for this admission?: Yes   


Plan: 


Under fair control.


 Selected Entries











  12/16/17 12/16/17 12/16/17





  05:11 07:47 12:16


 


Blood Pressure 141/79 H 150/75 H 142/77 H














  12/16/17 12/16/17





  16:12 20:12


 


Blood Pressure 144/71 H 148/73 H








 











Generic Name Dose Route Start Last Admin





  Trade Name Freq  PRN Reason Stop Dose Admin


 


Metoprolol Succinate  100 mg  12/14/17 22:00  12/16/17 23:11





  Toprol Xl 50 Mg Tab.Sr  PO  01/13/18 21:59  100 mg





  Q12 VERNON   














(3) Gallstones without obstruction of gallbladder


Qualifiers: 


   Cholelithiasis location: gallbladder   Cholecystitis presence: without 

cholecystitis   Qualified Code(s): K80.20 - Calculus of gallbladder without 

cholecystitis without obstruction   


Is this a current diagnosis for this admission?: Yes   


Plan: 


Surgery has feel seen this patient and does not feel as though he requires a 

cholecystectomy.  Patient had a HIDA scan done at [facility] which was normal.





Current studies show no evidence for acute cholecystitis.








(4) Abdominal aortic aneurysm


Qualifiers: 


   Presence of rupture: without rupture   Qualified Code(s): I71.4 - Abdominal 

aortic aneurysm, without rupture   


Is this a current diagnosis for this admission?: Yes   





(5) COPD (chronic obstructive pulmonary disease)


Qualifiers: 


   COPD type: unspecified COPD   Qualified Code(s): J44.9 - Chronic obstructive 

pulmonary disease, unspecified   


Is this a current diagnosis for this admission?: Yes   





(6) Coronary artery disease


Qualifiers: 


   Coronary Disease-Associated Artery/Lesion type: native artery   Native vs. 

transplanted heart: native heart   Associated angina: without angina   

Qualified Code(s): I25.10 - Atherosclerotic heart disease of native coronary 

artery without angina pectoris   


Is this a current diagnosis for this admission?: Yes   





(7) Diabetes mellitus type 2 in nonobese


Is this a current diagnosis for this admission?: Yes   


Plan: 


Fair control of blood glucose.


 











  12/14/17 12/14/17 12/15/17





  16:11 21:45 11:26


 


Glucose   104 


 


POC Glucose  126 H   176 H














  12/15/17 12/16/17





  16:33 19:55


 


Glucose   143 H


 


POC Glucose  113 H 














(8) Essential hypertension


Is this a current diagnosis for this admission?: Yes   





(9) Peripheral vascular disease


Is this a current diagnosis for this admission?: Yes   





(10) Senile dementia


Qualifiers: 


   Dementia behavioral disturbance: without behavioral disturbance   Qualified 

Code(s): F03.90 - Unspecified dementia without behavioral disturbance   


Is this a current diagnosis for this admission?: Yes   


Plan: 


Continue supportive care








- Time


Time Spent with patient: 15-24 minutes


Medications reviewed and adjusted accordingly: Yes


Anticipated discharge: Home


Within: within 24 hours, within 48 hours

## 2017-12-17 NOTE — PDOC PROGRESS REPORT
Subjective


Progress Note for:: 12/16/17


Subjective:: 


Does not appear to be septic blood or urine cultures done on 14 December showed 

no growth for 24 hours, negative white count, discussed case with Dr. Oliveira 

and we reviewed his medical records and old charts.  Patient has several small 

gallstones.  Wife at bedside very tearful stating she cannot take care of him 

at home anymore due to his dementia.  Patient and wife both state he tried to 

kill himself with a gun the day before he came into the hospital.  Asked 

patient why he wanted to kill himself he said he was tired of living in misery 

and pain.  





Reason For Visit: 


CHOLECYSTITIS, ACUTE RENAL FAILURE, ACCELERATED dementia wife having trouble 

taking care of him at home








Physical Exam


Vital Signs: 


 











Temp Pulse Resp BP Pulse Ox


 


 97.9 F   65   16   142/75 H  93 


 


 12/17/17 08:25  12/17/17 08:25  12/17/17 08:25  12/17/17 08:25  12/17/17 08:25








 Intake & Output











 12/16/17 12/17/17 12/18/17





 06:59 06:59 06:59


 


Intake Total 733 2138 


 


Output Total 250  


 


Balance 483 2138 


 


Weight 72.6 kg  











General appearance: PRESENT: no acute distress, well-developed, well-nourished


Head exam: PRESENT: atraumatic, normocephalic


Eye exam: PRESENT: conjunctiva pink, EOMI, PERRLA.  ABSENT: scleral icterus


Ear exam: PRESENT: normal external ear exam


Mouth exam: PRESENT: moist, tongue midline


Neck exam: ABSENT: carotid bruit, JVD, lymphadenopathy, thyromegaly


Respiratory exam: PRESENT: clear to auscultation elle.  ABSENT: rales, rhonchi, 

wheezes


Cardiovascular exam: PRESENT: RRR.  ABSENT: diastolic murmur, rubs, systolic 

murmur


Pulses: PRESENT: normal dorsalis pedis pul


Vascular exam: PRESENT: normal capillary refill


GI/Abdominal exam: PRESENT: normal bowel sounds, soft.  ABSENT: distended, 

guarding, mass, organolmegaly, rebound, tenderness


Rectal exam: PRESENT: deferred


Extremities exam: PRESENT: full ROM.  ABSENT: calf tenderness, clubbing, pedal 

edema


Neurological exam: PRESENT: alert, altered, awake, oriented to person, CN II-

XII grossly intact.  ABSENT: oriented to place, oriented to time, oriented to 

situation, motor sensory deficit


Psychiatric exam: PRESENT: appropriate affect, normal mood.  ABSENT: homicidal 

ideation, suicidal ideation


Focused psych exam: PRESENT: psychomotor agitation


Skin exam: PRESENT: dry, intact, warm.  ABSENT: cyanosis, rash





Results


Laboratory Results: 


 





 12/16/17 19:55 





 12/16/17 19:55 





 











  12/16/17 12/16/17 12/16/17





  19:55 19:55 19:55


 


WBC  10.3  


 


RBC  4.68  


 


Hgb  13.8  D  


 


Hct  40.6  


 


MCV  87  


 


MCH  29.4  


 


MCHC  33.9  


 


RDW  14.4 H  


 


Plt Count  146 L  


 


Seg Neutrophils %  74.8  


 


Lymphocytes %  18.1  


 


Monocytes %  5.7  


 


Eosinophils %  0.9  


 


Basophils %  0.5  


 


Absolute Neutrophils  7.7  


 


Absolute Lymphocytes  1.9  


 


Absolute Monocytes  0.6  


 


Absolute Eosinophils  0.1  


 


Absolute Basophils  0.0  


 


Sodium   136.6 L 


 


Potassium   4.1 


 


Chloride   102 


 


Carbon Dioxide   24 


 


Anion Gap   11 


 


BUN   29 H 


 


Creatinine   1.61 H 


 


Est GFR ( Amer)   52 L 


 


Est GFR (Non-Af Amer)   43 L 


 


Glucose   143 H 


 


Calcium   8.4 


 


Magnesium    2.1


 


Total Bilirubin   0.6 


 


AST   16 L 


 


ALT   31 


 


Alkaline Phosphatase   84 


 


Total Protein   5.4 L 


 


Albumin   3.0 L 








 











  12/14/17 12/14/17 12/15/17





  21:45 21:45 03:51


 


Creatine Kinase  24 L   < 20 L


 


CK-MB (CK-2)   0.63 


 


Troponin I   0.041 














  12/15/17 12/15/17 12/15/17





  03:51 10:10 10:10


 


Creatine Kinase   26 L 


 


CK-MB (CK-2)  0.52   0.58


 


Troponin I  0.031   0.019











Impressions: 


 





Abdomen/Pelvis CT  12/14/17 06:44


IMPRESSION:  NO SIGNIFICANT OR ACUTE PROCESS IN THE ABDOMEN OR PELVIS.


 








Chest X-Ray  12/14/17 06:44


IMPRESSION:  NO SIGNIFICANT RADIOGRAPHIC FINDING IN THE CHEST.


 








Head CT  12/14/17 06:44


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  No acute 

findings.


EVIDENCE OF ACUTE STROKE: NO.


 














Assessment & Plan





- Diagnosis


(1) Senile dementia


Qualifiers: 


   Dementia behavioral disturbance: without behavioral disturbance   Qualified 

Code(s): F03.90 - Unspecified dementia without behavioral disturbance   


Is this a current diagnosis for this admission?: Yes   


Plan: 


Continue patient on dementia medications.  Psych consult with the sitter to 

evaluate this recent incident of suicidal ideation.  Feel like his wife is able 

to make healthcare decisions on his behalf.  However patient has a high 

functioning level with a history of dementia so I do personally think he still 

a good candidate for surgery at this point But it does not appear that surgery 

is warranted at this time.  Patient will need home health needs to assist in 

activities of daily living due to his dementia














(2) Gallstone


Qualifiers: 


   Cholecystitis presence: with cholecystitis   Biliary obstruction: without 

biliary obstruction 


Is this a current diagnosis for this admission?: Yes   


Plan: 


Surgical consult for evaluation of cholecystectomy appreciate surgical consult.

  Felt patient did not need surgery now.  Would recommend follow-up outpatient 

for ongoing evaluation and monitoring due to his multiple ER and admission 

visits for the same complaint.








(3) Acute renal failure


Qualifiers: 


   Acute renal failure type: with acute tubular necrosis   Qualified Code(s): 

N17.0 - Acute kidney failure with tubular necrosis   


Is this a current diagnosis for this admission?: Yes   


Plan: 


Suspect this patient would benefit from Elizabeth cystectomy.  This is his third 

fourth hospitalization in the very short period of time.  He has been evaluated 

with chest pain, abdominal pain, nausea vomiting, right upper quadrant 

tenderness.  He has documented gallstones due to his age, diabetes, despite 

that he does not have elevated LFTs at this time or a dilated that does not 

mean he does not have an acute cholecystitis which personally I would recommend 

his gallbladder removed for he become septic and die.








(4) Diabetes mellitus type 2 in nonobese


Is this a current diagnosis for this admission?: Yes   


Plan: 


Stable continue home medications prior to this admission











(6) Gallstones without obstruction of gallbladder


Qualifiers: 


   Cholelithiasis location: gallbladder   Cholecystitis presence: without 

cholecystitis   Qualified Code(s): K80.20 - Calculus of gallbladder without 

cholecystitis without obstruction   


Is this a current diagnosis for this admission?: Yes   





(7) Accelerated hypertension


Is this a current diagnosis for this admission?: Yes   


Plan: 


Blood pressure is currently stable








(8) Essential hypertension


Is this a current diagnosis for this admission?: Yes   





(9) Peripheral vascular disease


Is this a current diagnosis for this admission?: Yes   





(10) Sepsis


Qualifiers: 


   Sepsis type: sepsis due to unspecified organism   Qualified Code(s): A41.9 - 

Sepsis, unspecified organism   


Is this a current diagnosis for this admission?: Yes   


Plan: 


Patient was treated with IV antibiotics responded well with therapy sepsis 

improved unknown source suspect can be coming from his small gallstones causing 

some transient occlusion





Plan discharge in a.m. will need home health to be arranged prior to patient 

being discharged home safe

## 2017-12-17 NOTE — PDOC DISCHARGE SUMMARY
General





- Admit/Disc Date/PCP


Admission Date/Primary Care Provider: 


  12/14/17 13:28





Acute on chronic cholecystitis and acute renal failure


Discharge Date: 12/17/17





- Discharge Diagnosis


(1) Acute renal failure


Is this a current diagnosis for this admission?: Yes   


Summary: 


Patient seems to be at his baseline at creatinine is 1.5 1.6 patient had acute 

on chronic renal failure resolved








(2) Senile dementia


Is this a current diagnosis for this admission?: Yes   


Summary: 


Patient has a history of dementia ongoing wife states he is hard to take care 

of at home we did get a psych consult currently on dementia medications follow-

up outpatient with appropriate primary care physician in staffing for ongoing 

senile dementia.  No acute needs to be addressed in the hospital outpatient 

care needs to be ongoing








(3) Gallstone


Is this a current diagnosis for this admission?: Yes   


Summary: 


Chronic in nature no abnormal wall thickening.  HIDA scan from previous 

hospital did not show any need for surgery right now continue to monitor and 

evaluate outpatient








(4) Diabetes mellitus type 2 in nonobese


Is this a current diagnosis for this admission?: Yes   








(6) Gallstones without obstruction of gallbladder


Is this a current diagnosis for this admission?: Yes   





(7) Accelerated hypertension


Is this a current diagnosis for this admission?: Yes   





(8) Essential hypertension


Is this a current diagnosis for this admission?: Yes   


Summary: 


Resolved currently on medications








(9) Peripheral vascular disease


Is this a current diagnosis for this admission?: Yes   





(10) Sepsis


Is this a current diagnosis for this admission?: Yes   


Summary: 


Patient was treated with appropriate antibiotics afebrile no signs of infection 

suspect sepsis was urinary tract








- Additional Information


Resuscitation Status: Do Not Resuscitate


Discharge Diet: As Tolerated


Discharge Activity: Activity As Tolerated


Home Medications: 








Aspirin 81 mg PO DAILY 12/29/15 


Atorvastatin Calcium [Lipitor 20 mg Tablet] 40 mg PO QHS 12/29/15 


Gabapentin [Neurontin] 600 mg PO QHS 12/29/15 


Nitroglycerin [Nitrostat 0.4 mg (1/150 Gr) Tabs 25/Bottle] 1 tab SL Q5MP PRN 12/

29/15 


Omeprazole 20 mg PO QAM 12/29/15 


Donepezil HCl 10 mg PO DAILY 06/17/17 


Cyanocobalamin (Vitamin B-12) [Vitamin B-12 1000 mcg Tablet] 1,000 mcg PO DAILY 

06/18/17 


Clopidogrel Bisulfate [Clopidogrel] 75 mg PO DAILY 12/14/17 


Fluticasone/Salmeterol [Advair 250-50 Diskus 14 Dose/Diskus] 1 inh IH Q12HP PRN 

12/14/17 


Ondansetron [Ondansetron Odt] 4 mg PO Q6HP PRN 12/14/17 


Metoprolol Succinate [Toprol Xl 50 mg Tab.sr] 100 mg PO Q12 30 Days #0 

tab.sr.24h 12/17/17 











History of Present Illness


History of Present Illness: 


MIGEL LE is a 72 year old male








Hospital Course


Hospital Course: 


on chronic patient was admitted with acute renal failure accelerated 

hypertension dementia progressively worse he had some underlying sepsis most 

likely from urinary tract infection secondary to acute renal failure with acute 

tubular necrosis which IV fluids gentle hydration seems to be at his baseline.  

He also had some intravascular volume depletion from acute viral 

gastroenteritis resolved.  Other past medical history includes abdominal aortic 

aneurysm without rupture and stable, COPD at his baseline, coronary artery 

disease stable patient is safely to return home.  Only new medication change 

was metoprolol succinate 100 mg p.o. twice daily he is follow-up with his 

primary care physician for ongoing refills.








Physical Exam


Vital Signs: 


 











Temp Pulse Resp BP Pulse Ox


 


 97.9 F   65   16   142/75 H  93 


 


 12/17/17 08:25  12/17/17 08:25  12/17/17 08:25  12/17/17 08:25  12/17/17 08:25








 Intake & Output











 12/16/17 12/17/17 12/18/17





 06:59 06:59 06:59


 


Intake Total 733 2138 


 


Output Total 250  


 


Balance 483 2138 


 


Weight 72.6 kg  











General appearance: PRESENT: no acute distress, well-developed, well-nourished


Head exam: PRESENT: atraumatic, normocephalic


Eye exam: PRESENT: conjunctiva pink, EOMI, PERRLA.  ABSENT: scleral icterus


Ear exam: PRESENT: normal external ear exam


Mouth exam: PRESENT: moist, tongue midline


Neck exam: ABSENT: carotid bruit, JVD, lymphadenopathy, thyromegaly


Respiratory exam: PRESENT: clear to auscultation elle.  ABSENT: rales, rhonchi, 

wheezes


Cardiovascular exam: PRESENT: RRR.  ABSENT: diastolic murmur, rubs, systolic 

murmur


Pulses: PRESENT: normal dorsalis pedis pul


Vascular exam: PRESENT: normal capillary refill


GI/Abdominal exam: PRESENT: normal bowel sounds, soft.  ABSENT: distended, 

guarding, mass, organolmegaly, rebound, tenderness


Rectal exam: PRESENT: deferred


Extremities exam: PRESENT: full ROM.  ABSENT: calf tenderness, clubbing, pedal 

edema


Neurological exam: PRESENT: alert, awake, oriented to person, oriented to place

, oriented to time, oriented to situation, CN II-XII grossly intact.  ABSENT: 

motor sensory deficit


Psychiatric exam: PRESENT: appropriate affect, normal mood.  ABSENT: homicidal 

ideation, suicidal ideation


Skin exam: PRESENT: dry, intact, warm.  ABSENT: cyanosis, rash





Results


Laboratory Results: 


 





 12/16/17 19:55 





 12/16/17 19:55 





 











  12/16/17 12/16/17 12/16/17





  19:55 19:55 19:55


 


WBC  10.3  


 


RBC  4.68  


 


Hgb  13.8  D  


 


Hct  40.6  


 


MCV  87  


 


MCH  29.4  


 


MCHC  33.9  


 


RDW  14.4 H  


 


Plt Count  146 L  


 


Seg Neutrophils %  74.8  


 


Lymphocytes %  18.1  


 


Monocytes %  5.7  


 


Eosinophils %  0.9  


 


Basophils %  0.5  


 


Absolute Neutrophils  7.7  


 


Absolute Lymphocytes  1.9  


 


Absolute Monocytes  0.6  


 


Absolute Eosinophils  0.1  


 


Absolute Basophils  0.0  


 


Sodium   136.6 L 


 


Potassium   4.1 


 


Chloride   102 


 


Carbon Dioxide   24 


 


Anion Gap   11 


 


BUN   29 H 


 


Creatinine   1.61 H 


 


Est GFR ( Amer)   52 L 


 


Est GFR (Non-Af Amer)   43 L 


 


Glucose   143 H 


 


Calcium   8.4 


 


Magnesium    2.1


 


Total Bilirubin   0.6 


 


AST   16 L 


 


ALT   31 


 


Alkaline Phosphatase   84 


 


Total Protein   5.4 L 


 


Albumin   3.0 L 








 











  12/14/17 12/14/17 12/15/17





  21:45 21:45 03:51


 


Creatine Kinase  24 L   < 20 L


 


CK-MB (CK-2)   0.63 


 


Troponin I   0.041 














  12/15/17 12/15/17 12/15/17





  03:51 10:10 10:10


 


Creatine Kinase   26 L 


 


CK-MB (CK-2)  0.52   0.58


 


Troponin I  0.031   0.019











Impressions: 


 





Abdomen/Pelvis CT  12/14/17 06:44


IMPRESSION:  NO SIGNIFICANT OR ACUTE PROCESS IN THE ABDOMEN OR PELVIS.


 








Chest X-Ray  12/14/17 06:44


IMPRESSION:  NO SIGNIFICANT RADIOGRAPHIC FINDING IN THE CHEST.


 








Head CT  12/14/17 06:44


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  No acute 

findings.


EVIDENCE OF ACUTE STROKE: NO.

## 2020-08-29 ENCOUNTER — HOSPITAL ENCOUNTER (EMERGENCY)
Dept: HOSPITAL 62 - ER | Age: 75
LOS: 1 days | Discharge: ANOTHER HOSPITAL | End: 2020-08-30
Payer: MEDICARE

## 2020-08-29 DIAGNOSIS — I11.0: ICD-10-CM

## 2020-08-29 DIAGNOSIS — Z87.891: ICD-10-CM

## 2020-08-29 DIAGNOSIS — Z20.828: ICD-10-CM

## 2020-08-29 DIAGNOSIS — R42: ICD-10-CM

## 2020-08-29 DIAGNOSIS — T68.XXXA: ICD-10-CM

## 2020-08-29 DIAGNOSIS — Z79.01: ICD-10-CM

## 2020-08-29 DIAGNOSIS — Z79.899: ICD-10-CM

## 2020-08-29 DIAGNOSIS — R65.20: ICD-10-CM

## 2020-08-29 DIAGNOSIS — F32.9: ICD-10-CM

## 2020-08-29 DIAGNOSIS — F03.90: ICD-10-CM

## 2020-08-29 DIAGNOSIS — J44.9: ICD-10-CM

## 2020-08-29 DIAGNOSIS — I95.9: ICD-10-CM

## 2020-08-29 DIAGNOSIS — I50.9: ICD-10-CM

## 2020-08-29 DIAGNOSIS — A41.9: Primary | ICD-10-CM

## 2020-08-29 DIAGNOSIS — I25.2: ICD-10-CM

## 2020-08-29 DIAGNOSIS — E11.51: ICD-10-CM

## 2020-08-29 DIAGNOSIS — Z79.891: ICD-10-CM
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
## 2020-08-29 DIAGNOSIS — I95.89: ICD-10-CM

## 2020-08-29 DIAGNOSIS — J96.01: ICD-10-CM

## 2020-08-29 DIAGNOSIS — Z98.890: ICD-10-CM

## 2020-08-29 DIAGNOSIS — Z79.4: ICD-10-CM

## 2020-08-29 DIAGNOSIS — I25.10: ICD-10-CM

## 2020-08-29 DIAGNOSIS — R53.81: ICD-10-CM

## 2020-08-29 DIAGNOSIS — E86.1: ICD-10-CM

## 2020-08-29 DIAGNOSIS — R53.1: ICD-10-CM

## 2020-08-29 LAB
ADD MANUAL DIFF: NO
ALBUMIN SERPL-MCNC: 2.1 G/DL (ref 3.5–5)
ALP SERPL-CCNC: 70 U/L (ref 38–126)
ANION GAP SERPL CALC-SCNC: 2 MMOL/L (ref 5–19)
ARTERIAL BLOOD H2CO3: 2.88 MMOL/L (ref 1.05–1.35)
ARTERIAL BLOOD HCO3: 35 MMOL/L (ref 20–24)
ARTERIAL BLOOD PCO2: 95.8 MMHG (ref 35–45)
ARTERIAL BLOOD PH: 7.18 (ref 7.35–7.45)
ARTERIAL BLOOD PO2: 80.7 MMHG (ref 80–100)
ARTERIAL BLOOD TOTAL CO2: 37.9 MMOL/L (ref 23–27)
AST SERPL-CCNC: 16 U/L (ref 17–59)
BASE EXCESS BLDA CALC-SCNC: 4.5 MMOL/L
BASOPHILS # BLD AUTO: 0 10^3/UL (ref 0–0.2)
BASOPHILS NFR BLD AUTO: 0.4 % (ref 0–2)
BILIRUB DIRECT SERPL-MCNC: 0.3 MG/DL (ref 0–0.4)
BILIRUB SERPL-MCNC: 0.4 MG/DL (ref 0.2–1.3)
BUN SERPL-MCNC: 52 MG/DL (ref 7–20)
CALCIUM: 7.7 MG/DL (ref 8.4–10.2)
CHLORIDE SERPL-SCNC: 101 MMOL/L (ref 98–107)
CK SERPL-CCNC: 31 U/L (ref 55–170)
CO2 SERPL-SCNC: 37 MMOL/L (ref 22–30)
EOSINOPHIL # BLD AUTO: 0.1 10^3/UL (ref 0–0.6)
EOSINOPHIL NFR BLD AUTO: 1.6 % (ref 0–6)
ERYTHROCYTE [DISTWIDTH] IN BLOOD BY AUTOMATED COUNT: 13.6 % (ref 11.5–14)
GLUCOSE SERPL-MCNC: 104 MG/DL (ref 75–110)
HCT VFR BLD CALC: 30.2 % (ref 37.9–51)
HGB BLD-MCNC: 10 G/DL (ref 13.5–17)
INR PPP: 1.04
LYMPHOCYTES # BLD AUTO: 0.3 10^3/UL (ref 0.5–4.7)
LYMPHOCYTES NFR BLD AUTO: 6.6 % (ref 13–45)
MCH RBC QN AUTO: 30.7 PG (ref 27–33.4)
MCHC RBC AUTO-ENTMCNC: 33.1 G/DL (ref 32–36)
MCV RBC AUTO: 93 FL (ref 80–97)
MONOCYTES # BLD AUTO: 0.2 10^3/UL (ref 0.1–1.4)
MONOCYTES NFR BLD AUTO: 4.1 % (ref 3–13)
NEUTROPHILS # BLD AUTO: 4.2 10^3/UL (ref 1.7–8.2)
NEUTS SEG NFR BLD AUTO: 87.3 % (ref 42–78)
PLATELET # BLD: 56 10^3/UL (ref 150–450)
POTASSIUM SERPL-SCNC: 4.3 MMOL/L (ref 3.6–5)
PROT SERPL-MCNC: 4.4 G/DL (ref 6.3–8.2)
PROTHROMBIN TIME: 13.8 SEC (ref 11.4–15.4)
RBC # BLD AUTO: 3.25 10^6/UL (ref 4.35–5.55)
SAO2 % BLDA: 92 % (ref 94–98)
TOTAL CELLS COUNTED % (AUTO): 100 %
WBC # BLD AUTO: 4.8 10^3/UL (ref 4–10.5)

## 2020-08-29 PROCEDURE — 93010 ELECTROCARDIOGRAM REPORT: CPT

## 2020-08-29 PROCEDURE — 87040 BLOOD CULTURE FOR BACTERIA: CPT

## 2020-08-29 PROCEDURE — 83605 ASSAY OF LACTIC ACID: CPT

## 2020-08-29 PROCEDURE — 87077 CULTURE AEROBIC IDENTIFY: CPT

## 2020-08-29 PROCEDURE — 71045 X-RAY EXAM CHEST 1 VIEW: CPT

## 2020-08-29 PROCEDURE — 94660 CPAP INITIATION&MGMT: CPT

## 2020-08-29 PROCEDURE — 96366 THER/PROPH/DIAG IV INF ADDON: CPT

## 2020-08-29 PROCEDURE — 31500 INSERT EMERGENCY AIRWAY: CPT

## 2020-08-29 PROCEDURE — 82803 BLOOD GASES ANY COMBINATION: CPT

## 2020-08-29 PROCEDURE — 85025 COMPLETE CBC W/AUTO DIFF WBC: CPT

## 2020-08-29 PROCEDURE — 85610 PROTHROMBIN TIME: CPT

## 2020-08-29 PROCEDURE — 96376 TX/PRO/DX INJ SAME DRUG ADON: CPT

## 2020-08-29 PROCEDURE — 99292 CRITICAL CARE ADDL 30 MIN: CPT

## 2020-08-29 PROCEDURE — 87186 SC STD MICRODIL/AGAR DIL: CPT

## 2020-08-29 PROCEDURE — 93005 ELECTROCARDIOGRAM TRACING: CPT

## 2020-08-29 PROCEDURE — 80053 COMPREHEN METABOLIC PANEL: CPT

## 2020-08-29 PROCEDURE — 96365 THER/PROPH/DIAG IV INF INIT: CPT

## 2020-08-29 PROCEDURE — 84484 ASSAY OF TROPONIN QUANT: CPT

## 2020-08-29 PROCEDURE — 36600 WITHDRAWAL OF ARTERIAL BLOOD: CPT

## 2020-08-29 PROCEDURE — C9803 HOPD COVID-19 SPEC COLLECT: HCPCS

## 2020-08-29 PROCEDURE — 36415 COLL VENOUS BLD VENIPUNCTURE: CPT

## 2020-08-29 PROCEDURE — 83735 ASSAY OF MAGNESIUM: CPT

## 2020-08-29 PROCEDURE — 96375 TX/PRO/DX INJ NEW DRUG ADDON: CPT

## 2020-08-29 PROCEDURE — 51702 INSERT TEMP BLADDER CATH: CPT

## 2020-08-29 PROCEDURE — 82550 ASSAY OF CK (CPK): CPT

## 2020-08-29 PROCEDURE — 99291 CRITICAL CARE FIRST HOUR: CPT

## 2020-08-29 PROCEDURE — 83880 ASSAY OF NATRIURETIC PEPTIDE: CPT

## 2020-08-29 PROCEDURE — 87150 DNA/RNA AMPLIFIED PROBE: CPT

## 2020-08-29 PROCEDURE — 87635 SARS-COV-2 COVID-19 AMP PRB: CPT

## 2020-08-29 PROCEDURE — 96368 THER/DIAG CONCURRENT INF: CPT
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
## 2020-08-29 NOTE — RADIOLOGY REPORT (SQ)
AP Portable chest: 8/29/2020 8:02 PM CDT



History: 74-year old patient with hypoxia.



Comparison: Chest radiograph performed 12/14/2017.



Findings: The cardiomediastinal silhouette is normal in size. No

pneumothorax is seen. There is volume loss within the right

hemithorax with a moderate right effusion and overlying airspace

opacities. There may be collapse of the right middle lobe. The

descending aorta is ectatic. There is obscuration of the right

hemidiaphragm, likely due to overlying airspace opacities and/or

effusions.



Impression: There is volume loss at the right hemithorax with a

moderate right effusion. There may be collapse of the right

middle lobe. This could be due to a mass or mucous plug. This

will need interval follow-up. Bronchoscopy may be warranted.

## 2020-08-30 VITALS — SYSTOLIC BLOOD PRESSURE: 80 MMHG | DIASTOLIC BLOOD PRESSURE: 52 MMHG

## 2020-08-30 RX ADMIN — SODIUM CHLORIDE PRN MLS/HR: 9 INJECTION, SOLUTION INTRAVENOUS at 00:57

## 2020-08-30 RX ADMIN — SODIUM CHLORIDE PRN MLS/HR: 9 INJECTION, SOLUTION INTRAVENOUS at 01:35

## 2020-08-30 NOTE — ER DOCUMENT REPORT
Doctor's Note


Notes: 





08/30/20 03:53


Transport has arrived to take patient via medical helicopter to [facility] in

[place].  Patient is tolerating intubation and being on the ventilator well.  

Patient appears stable for transfer at this time.

## 2020-08-30 NOTE — EKG REPORT
SEVERITY:- ABNORMAL ECG -

ATRIAL FIBRILLATION, V-RATE 52-70

VENTRICULAR TRIGEMINY

NONSPECIFIC T ABNORMALITIES, DIFFUSE LEADS

:

Confirmed by: Rachell Marsh MD 30-Aug-2020 10:31:53

## 2020-08-30 NOTE — RADIOLOGY REPORT (SQ)
EXAM DESCRIPTION: 



XR CHEST 1 VIEW



COMPLETED DATE/TME:  08/30/2020 02:32



CLINICAL HISTORY: 



74 years, Male, CHF



COMPARISON:

8/29/2020



NUMBER OF VIEWS:

One



TECHNIQUE:

AP view of the chest



LIMITATIONS:

None.



FINDINGS:



There is decrease in size of the right pleural effusion with

improved aeration of the right lung base. Again noted is

right-sided volume loss with mild rightward mediastinal shift.

The endotracheal tube is in satisfactory position 26.1 cm above

the karine. The nasogastric tube satisfactory position

terminating within the stomach. No pneumothorax. Heart size is

stable. Bones are unchanged.



IMPRESSION:



Satisfactory position of the endotracheal and nasogastric tubes.

Decrease in size of the right pleural effusion with improved

aeration of the right lung base.

 



copyright 2011 [employer]- All Rights Reserved

## 2020-08-30 NOTE — ER DOCUMENT REPORT
Doctor's Note


Notes: 





08/30/20 02:49


At 02 30 this MD was notified that during the course of transferring the patient

from the BiPAP machine he was on here in the emergency department to the BiPAP 

machine that transport was using, patient appeared to become more altered and 

had markedly decreased chest rise.  In order to protect the patient's airway and

with concerns of respiratory failure and hypercapnia this MD decided to protect 

the patient's airway using rapid sequence intubation with a #8 ET tube. See 

procedure note





Procedures





- Intubation


  ** Orotracheal


Time of Intubation: 02:40 - respiratory failure 


Mallampati Classification: Class 3


Medications: Etomidate, Succinylcholine


Intubation method: Orotracheal


Blade type: Gates


Blade size: 4


Equipment used: Glidescope


ETT size: 8.0


ETT secured at: Lips


ETT secured at (cm): 22


Breath Sounds after Intubation: Equal


End tidal CO2 confirmed: Yes


Post Intubation Xray: Yes - ETT appears to be roughly 3 cm above karine. RT 

asked to advance tube 1 cm.


Intubation Complications: No complications

## 2022-06-18 NOTE — ER DOCUMENT REPORT
Doctor's Note


Notes: 





08/30/20 02:56


Given the patient was no longer able to be transported by the ground transport 

crew, nursing staff got in touch with [facility].  They agreed to fly the patient

to [facility].  This MD change mode of transport on the Intal sheet, put a 

single tiffany through the ground ALS area and put my initials date time beside it. Rest, ice, elevate leg.  Tylenol as needed for pain.   Keep wound clean and dry.  Wash daily with soap and water.  Can apply Vaseline or antibiotic ointment.  Monitor for signs of infection (redness, increasing pain or swelling, purulent drainage, fever) and return or go to ER if present.